# Patient Record
Sex: FEMALE | Race: WHITE | Employment: STUDENT | URBAN - METROPOLITAN AREA
[De-identification: names, ages, dates, MRNs, and addresses within clinical notes are randomized per-mention and may not be internally consistent; named-entity substitution may affect disease eponyms.]

---

## 2020-01-04 PROBLEM — N94.3 PMS (PREMENSTRUAL SYNDROME): Status: ACTIVE | Noted: 2020-01-04

## 2021-11-05 PROBLEM — Z34.00 SUPERVISION OF NORMAL FIRST PREGNANCY, ANTEPARTUM (HCC): Status: ACTIVE | Noted: 2021-11-05

## 2021-11-05 PROBLEM — Z34.00 SUPERVISION OF NORMAL FIRST PREGNANCY, ANTEPARTUM: Status: ACTIVE | Noted: 2021-11-05

## 2021-12-23 PROBLEM — U07.1 COVID-19 AFFECTING PREGNANCY IN SECOND TRIMESTER (HCC): Status: ACTIVE | Noted: 2021-12-23

## 2021-12-23 PROBLEM — O98.512 COVID-19 AFFECTING PREGNANCY IN SECOND TRIMESTER: Status: ACTIVE | Noted: 2021-12-23

## 2021-12-23 PROBLEM — U07.1 COVID-19 AFFECTING PREGNANCY IN SECOND TRIMESTER: Status: ACTIVE | Noted: 2021-12-23

## 2021-12-23 PROBLEM — O98.512 COVID-19 AFFECTING PREGNANCY IN SECOND TRIMESTER (HCC): Status: ACTIVE | Noted: 2021-12-23

## 2021-12-28 PROBLEM — U07.1 COVID-19 AFFECTING PREGNANCY IN SECOND TRIMESTER: Status: ACTIVE | Noted: 2021-12-23

## 2021-12-28 PROBLEM — O98.512 COVID-19 AFFECTING PREGNANCY IN SECOND TRIMESTER (HCC): Status: ACTIVE | Noted: 2021-12-23

## 2021-12-28 PROBLEM — O98.512 COVID-19 AFFECTING PREGNANCY IN SECOND TRIMESTER: Status: ACTIVE | Noted: 2021-12-23

## 2021-12-28 PROBLEM — U07.1 COVID-19 AFFECTING PREGNANCY IN SECOND TRIMESTER (HCC): Status: ACTIVE | Noted: 2021-12-23

## 2023-07-11 LAB
AMB EXT CHLAMYDIA, NUCLEIC ACID AMP: NOT DETECTED
AMB EXT GONOCOCCUS, NUCLEIC ACID AMP: NOT DETECTED
AMB EXT HEMATOCRIT: 37.6
AMB EXT HEMOGLOBIN: 12.9
AMB EXT MCV: 91.3
AMB EXT PLATELETS: 228
ANTIBODY SCREEN OB: NEGATIVE
HIV RESULT OB: NEGATIVE
RAPID PLASMA REAGIN OB: NONREACTIVE
RH FACTOR OB: POSITIVE

## 2023-11-24 LAB
AMB EXT TREPONEMAL ANTIBODIES: NEGATIVE
HIV ANTIGEN-ANTIBODY QUAL: NONREACTIVE

## 2024-01-24 ENCOUNTER — HOSPITAL ENCOUNTER (INPATIENT)
Facility: HOSPITAL | Age: 29
LOS: 3 days | Discharge: HOME | End: 2024-01-27
Attending: OBSTETRICS & GYNECOLOGY | Admitting: OBSTETRICS & GYNECOLOGY
Payer: COMMERCIAL

## 2024-01-24 ENCOUNTER — ANESTHESIA (OUTPATIENT)
Dept: OBGYN UNIT | Facility: HOSPITAL | Age: 29
End: 2024-01-24
Payer: COMMERCIAL

## 2024-01-24 ENCOUNTER — ANESTHESIA EVENT (OUTPATIENT)
Dept: OBGYN UNIT | Facility: HOSPITAL | Age: 29
End: 2024-01-24
Payer: COMMERCIAL

## 2024-01-24 PROBLEM — O26.93 PREGNANCY RELATED CONDITION IN THIRD TRIMESTER: Status: ACTIVE | Noted: 2024-01-24

## 2024-01-24 PROBLEM — O26.93 PREGNANCY RELATED CONDITION IN THIRD TRIMESTER (HCC): Status: ACTIVE | Noted: 2024-01-24

## 2024-01-24 LAB
BASOPHILS # BLD AUTO: 0.03 X10(3) UL (ref 0–0.2)
BASOPHILS NFR BLD AUTO: 0.3 %
DEPRECATED RDW RBC AUTO: 41.7 FL (ref 35.1–46.3)
EOSINOPHIL # BLD AUTO: 0.09 X10(3) UL (ref 0–0.7)
EOSINOPHIL NFR BLD AUTO: 0.8 %
ERYTHROCYTE [DISTWIDTH] IN BLOOD BY AUTOMATED COUNT: 12.6 % (ref 11–15)
HCT VFR BLD AUTO: 35.9 %
HGB BLD-MCNC: 12.5 G/DL
IMM GRANULOCYTES # BLD AUTO: 0.02 X10(3) UL (ref 0–1)
IMM GRANULOCYTES NFR BLD: 0.2 %
LYMPHOCYTES # BLD AUTO: 2.14 X10(3) UL (ref 1–4)
LYMPHOCYTES NFR BLD AUTO: 19.9 %
MCH RBC QN AUTO: 31.7 PG (ref 26–34)
MCHC RBC AUTO-ENTMCNC: 34.8 G/DL (ref 31–37)
MCV RBC AUTO: 91.1 FL
MONOCYTES # BLD AUTO: 0.9 X10(3) UL (ref 0.1–1)
MONOCYTES NFR BLD AUTO: 8.3 %
NEUTROPHILS # BLD AUTO: 7.6 X10 (3) UL (ref 1.5–7.7)
NEUTROPHILS # BLD AUTO: 7.6 X10(3) UL (ref 1.5–7.7)
NEUTROPHILS NFR BLD AUTO: 70.5 %
PLATELET # BLD AUTO: 222 10(3)UL (ref 150–450)
RBC # BLD AUTO: 3.94 X10(6)UL
WBC # BLD AUTO: 10.8 X10(3) UL (ref 4–11)

## 2024-01-24 RX ORDER — BETAMETHASONE SODIUM PHOSPHATE AND BETAMETHASONE ACETATE 3; 3 MG/ML; MG/ML
12 INJECTION, SUSPENSION INTRA-ARTICULAR; INTRALESIONAL; INTRAMUSCULAR; SOFT TISSUE EVERY 24 HOURS
Status: DISCONTINUED | OUTPATIENT
Start: 2024-01-24 | End: 2024-01-25

## 2024-01-24 RX ORDER — METOCLOPRAMIDE HYDROCHLORIDE 5 MG/ML
10 INJECTION INTRAMUSCULAR; INTRAVENOUS ONCE
Status: COMPLETED | OUTPATIENT
Start: 2024-01-24 | End: 2024-01-24

## 2024-01-24 RX ORDER — FAMOTIDINE 10 MG/ML
20 INJECTION, SOLUTION INTRAVENOUS ONCE
Status: COMPLETED | OUTPATIENT
Start: 2024-01-24 | End: 2024-01-24

## 2024-01-24 RX ORDER — CITRIC ACID/SODIUM CITRATE 334-500MG
30 SOLUTION, ORAL ORAL ONCE
Status: COMPLETED | OUTPATIENT
Start: 2024-01-24 | End: 2024-01-24

## 2024-01-24 RX ORDER — ONDANSETRON 2 MG/ML
4 INJECTION INTRAMUSCULAR; INTRAVENOUS EVERY 6 HOURS PRN
Status: DISCONTINUED | OUTPATIENT
Start: 2024-01-24 | End: 2024-01-25

## 2024-01-24 RX ORDER — METOCLOPRAMIDE 10 MG/1
10 TABLET ORAL ONCE
Status: COMPLETED | OUTPATIENT
Start: 2024-01-24 | End: 2024-01-24

## 2024-01-24 RX ORDER — CEFAZOLIN SODIUM/WATER 2 G/20 ML
SYRINGE (ML) INTRAVENOUS
Status: DISCONTINUED
Start: 2024-01-24 | End: 2024-01-25 | Stop reason: WASHOUT

## 2024-01-24 RX ORDER — ACETAMINOPHEN 500 MG
1000 TABLET ORAL ONCE
Status: COMPLETED | OUTPATIENT
Start: 2024-01-24 | End: 2024-01-24

## 2024-01-24 RX ORDER — FAMOTIDINE 20 MG/1
20 TABLET, FILM COATED ORAL ONCE
Status: COMPLETED | OUTPATIENT
Start: 2024-01-24 | End: 2024-01-24

## 2024-01-24 RX ORDER — CALCIUM CARBONATE 500 MG/1
1 TABLET, CHEWABLE ORAL DAILY
COMMUNITY

## 2024-01-24 RX ORDER — LIDOCAINE HCL/EPINEPHRINE/PF 2%-1:200K
VIAL (ML) INJECTION
Status: DISCONTINUED
Start: 2024-01-24 | End: 2024-01-24 | Stop reason: WASHOUT

## 2024-01-24 RX ORDER — METOCLOPRAMIDE HYDROCHLORIDE 5 MG/ML
10 INJECTION INTRAMUSCULAR; INTRAVENOUS ONCE
Status: DISCONTINUED | OUTPATIENT
Start: 2024-01-24 | End: 2024-01-25

## 2024-01-24 RX ORDER — SODIUM CHLORIDE, SODIUM LACTATE, POTASSIUM CHLORIDE, CALCIUM CHLORIDE 600; 310; 30; 20 MG/100ML; MG/100ML; MG/100ML; MG/100ML
125 INJECTION, SOLUTION INTRAVENOUS CONTINUOUS
Status: DISCONTINUED | OUTPATIENT
Start: 2024-01-24 | End: 2024-01-25

## 2024-01-24 RX ORDER — FAMOTIDINE 20 MG/1
20 TABLET, FILM COATED ORAL ONCE
Status: DISCONTINUED | OUTPATIENT
Start: 2024-01-24 | End: 2024-01-25

## 2024-01-24 RX ORDER — FAMOTIDINE 10 MG/ML
20 INJECTION, SOLUTION INTRAVENOUS ONCE
Status: DISCONTINUED | OUTPATIENT
Start: 2024-01-24 | End: 2024-01-25

## 2024-01-24 RX ORDER — CLINDAMYCIN PHOSPHATE 900 MG/50ML
900 INJECTION, SOLUTION INTRAVENOUS ONCE
Status: DISCONTINUED | OUTPATIENT
Start: 2024-01-24 | End: 2024-01-25

## 2024-01-24 RX ORDER — METOCLOPRAMIDE 10 MG/1
10 TABLET ORAL ONCE
Status: DISCONTINUED | OUTPATIENT
Start: 2024-01-24 | End: 2024-01-25

## 2024-01-24 RX ADMIN — SODIUM CHLORIDE, SODIUM LACTATE, POTASSIUM CHLORIDE, CALCIUM CHLORIDE: 600; 310; 30; 20 INJECTION, SOLUTION INTRAVENOUS at 23:49:00

## 2024-01-24 RX ADMIN — BUPIVACAINE HYDROCHLORIDE 1.6 ML: 7.5 INJECTION, SOLUTION INTRASPINAL at 23:57:00

## 2024-01-24 RX ADMIN — MORPHINE SULFATE 0.3 MG: 1 INJECTION, SOLUTION EPIDURAL; INTRATHECAL; INTRAVENOUS at 23:57:00

## 2024-01-24 RX ADMIN — PHENYLEPHRINE HCL 200 MCG: 10 MG/ML VIAL (ML) INJECTION at 23:58:00

## 2024-01-24 RX ADMIN — LIDOCAINE HYDROCHLORIDE 3 ML: 10 INJECTION, SOLUTION EPIDURAL; INFILTRATION; INTRACAUDAL; PERINEURAL at 23:52:00

## 2024-01-25 LAB
ANTIBODY SCREEN: NEGATIVE
RH BLOOD TYPE: POSITIVE
RH BLOOD TYPE: POSITIVE

## 2024-01-25 PROCEDURE — 59514 CESAREAN DELIVERY ONLY: CPT | Performed by: OBSTETRICS & GYNECOLOGY

## 2024-01-25 RX ORDER — AMMONIA INHALANTS 0.04 G/.3ML
0.3 INHALANT RESPIRATORY (INHALATION) AS NEEDED
Status: DISCONTINUED | OUTPATIENT
Start: 2024-01-25 | End: 2024-01-27

## 2024-01-25 RX ORDER — MIDAZOLAM HYDROCHLORIDE 1 MG/ML
INJECTION INTRAMUSCULAR; INTRAVENOUS AS NEEDED
Status: DISCONTINUED | OUTPATIENT
Start: 2024-01-25 | End: 2024-01-25 | Stop reason: SURG

## 2024-01-25 RX ORDER — OXYCODONE HYDROCHLORIDE 5 MG/1
5 TABLET ORAL EVERY 6 HOURS PRN
Status: DISCONTINUED | OUTPATIENT
Start: 2024-01-26 | End: 2024-01-27

## 2024-01-25 RX ORDER — GABAPENTIN 300 MG/1
300 CAPSULE ORAL EVERY 8 HOURS PRN
Status: DISCONTINUED | OUTPATIENT
Start: 2024-01-25 | End: 2024-01-27

## 2024-01-25 RX ORDER — SCOLOPAMINE TRANSDERMAL SYSTEM 1 MG/1
1 PATCH, EXTENDED RELEASE TRANSDERMAL
Status: DISCONTINUED | OUTPATIENT
Start: 2024-01-25 | End: 2024-01-27

## 2024-01-25 RX ORDER — CHOLECALCIFEROL (VITAMIN D3) 25 MCG
1 TABLET,CHEWABLE ORAL DAILY
Status: DISCONTINUED | OUTPATIENT
Start: 2024-01-25 | End: 2024-01-27

## 2024-01-25 RX ORDER — DOCUSATE SODIUM 100 MG/1
100 CAPSULE, LIQUID FILLED ORAL
Status: DISCONTINUED | OUTPATIENT
Start: 2024-01-25 | End: 2024-01-27

## 2024-01-25 RX ORDER — DIPHENHYDRAMINE HCL 25 MG
25 CAPSULE ORAL EVERY 4 HOURS PRN
Status: ACTIVE | OUTPATIENT
Start: 2024-01-25 | End: 2024-01-26

## 2024-01-25 RX ORDER — CLINDAMYCIN PHOSPHATE 150 MG/ML
INJECTION, SOLUTION INTRAVENOUS AS NEEDED
Status: DISCONTINUED | OUTPATIENT
Start: 2024-01-25 | End: 2024-01-25 | Stop reason: SURG

## 2024-01-25 RX ORDER — NALBUPHINE HYDROCHLORIDE 10 MG/ML
2.5 INJECTION, SOLUTION INTRAMUSCULAR; INTRAVENOUS; SUBCUTANEOUS
OUTPATIENT
Start: 2024-01-25

## 2024-01-25 RX ORDER — PROCHLORPERAZINE EDISYLATE 5 MG/ML
5 INJECTION INTRAMUSCULAR; INTRAVENOUS ONCE AS NEEDED
Status: COMPLETED | OUTPATIENT
Start: 2024-01-25 | End: 2024-01-25

## 2024-01-25 RX ORDER — SIMETHICONE 80 MG
80 TABLET,CHEWABLE ORAL 3 TIMES DAILY PRN
Status: DISCONTINUED | OUTPATIENT
Start: 2024-01-25 | End: 2024-01-27

## 2024-01-25 RX ORDER — PHENYLEPHRINE HCL 10 MG/ML
VIAL (ML) INJECTION AS NEEDED
Status: DISCONTINUED | OUTPATIENT
Start: 2024-01-24 | End: 2024-01-25 | Stop reason: SURG

## 2024-01-25 RX ORDER — IBUPROFEN 600 MG/1
600 TABLET ORAL EVERY 6 HOURS
Status: DISCONTINUED | OUTPATIENT
Start: 2024-01-26 | End: 2024-01-27

## 2024-01-25 RX ORDER — NALBUPHINE HYDROCHLORIDE 10 MG/ML
2.5 INJECTION, SOLUTION INTRAMUSCULAR; INTRAVENOUS; SUBCUTANEOUS EVERY 4 HOURS PRN
Status: DISPENSED | OUTPATIENT
Start: 2024-01-25 | End: 2024-01-26

## 2024-01-25 RX ORDER — ACETAMINOPHEN 500 MG
1000 TABLET ORAL EVERY 6 HOURS
Status: DISCONTINUED | OUTPATIENT
Start: 2024-01-25 | End: 2024-01-27

## 2024-01-25 RX ORDER — DEXAMETHASONE SODIUM PHOSPHATE 4 MG/ML
VIAL (ML) INJECTION AS NEEDED
Status: DISCONTINUED | OUTPATIENT
Start: 2024-01-25 | End: 2024-01-25 | Stop reason: SURG

## 2024-01-25 RX ORDER — HYDROCODONE BITARTRATE AND ACETAMINOPHEN 7.5; 325 MG/1; MG/1
1 TABLET ORAL EVERY 6 HOURS PRN
Status: ACTIVE | OUTPATIENT
Start: 2024-01-25 | End: 2024-01-26

## 2024-01-25 RX ORDER — NALOXONE HYDROCHLORIDE 0.4 MG/ML
0.08 INJECTION, SOLUTION INTRAMUSCULAR; INTRAVENOUS; SUBCUTANEOUS
Status: DISPENSED | OUTPATIENT
Start: 2024-01-25 | End: 2024-01-26

## 2024-01-25 RX ORDER — HALOPERIDOL 5 MG/ML
0.5 INJECTION INTRAMUSCULAR ONCE AS NEEDED
Status: COMPLETED | OUTPATIENT
Start: 2024-01-25 | End: 2024-01-25

## 2024-01-25 RX ORDER — HYDROMORPHONE HYDROCHLORIDE 1 MG/ML
0.3 INJECTION, SOLUTION INTRAMUSCULAR; INTRAVENOUS; SUBCUTANEOUS EVERY 2 HOUR PRN
Status: DISCONTINUED | OUTPATIENT
Start: 2024-01-26 | End: 2024-01-27

## 2024-01-25 RX ORDER — HYDROCODONE BITARTRATE AND ACETAMINOPHEN 7.5; 325 MG/1; MG/1
2 TABLET ORAL EVERY 6 HOURS PRN
Status: ACTIVE | OUTPATIENT
Start: 2024-01-25 | End: 2024-01-26

## 2024-01-25 RX ORDER — KETOROLAC TROMETHAMINE 30 MG/ML
30 INJECTION, SOLUTION INTRAMUSCULAR; INTRAVENOUS EVERY 6 HOURS
Status: DISPENSED | OUTPATIENT
Start: 2024-01-25 | End: 2024-01-26

## 2024-01-25 RX ORDER — BISACODYL 10 MG
10 SUPPOSITORY, RECTAL RECTAL ONCE AS NEEDED
Status: DISCONTINUED | OUTPATIENT
Start: 2024-01-25 | End: 2024-01-27

## 2024-01-25 RX ORDER — ONDANSETRON 2 MG/ML
INJECTION INTRAMUSCULAR; INTRAVENOUS AS NEEDED
Status: DISCONTINUED | OUTPATIENT
Start: 2024-01-25 | End: 2024-01-25 | Stop reason: SURG

## 2024-01-25 RX ORDER — ONDANSETRON 2 MG/ML
4 INJECTION INTRAMUSCULAR; INTRAVENOUS ONCE AS NEEDED
Status: ACTIVE | OUTPATIENT
Start: 2024-01-25 | End: 2024-01-25

## 2024-01-25 RX ORDER — HYDROMORPHONE HYDROCHLORIDE 1 MG/ML
0.6 INJECTION, SOLUTION INTRAMUSCULAR; INTRAVENOUS; SUBCUTANEOUS
Status: ACTIVE | OUTPATIENT
Start: 2024-01-25 | End: 2024-01-26

## 2024-01-25 RX ORDER — ACETAMINOPHEN 325 MG/1
650 TABLET ORAL EVERY 6 HOURS PRN
Status: ACTIVE | OUTPATIENT
Start: 2024-01-25 | End: 2024-01-26

## 2024-01-25 RX ORDER — DEXTROSE, SODIUM CHLORIDE, SODIUM LACTATE, POTASSIUM CHLORIDE, AND CALCIUM CHLORIDE 5; .6; .31; .03; .02 G/100ML; G/100ML; G/100ML; G/100ML; G/100ML
INJECTION, SOLUTION INTRAVENOUS CONTINUOUS
Status: DISCONTINUED | OUTPATIENT
Start: 2024-01-25 | End: 2024-01-27

## 2024-01-25 RX ORDER — LIDOCAINE HYDROCHLORIDE 10 MG/ML
INJECTION, SOLUTION EPIDURAL; INFILTRATION; INTRACAUDAL; PERINEURAL AS NEEDED
Status: DISCONTINUED | OUTPATIENT
Start: 2024-01-24 | End: 2024-01-25 | Stop reason: SURG

## 2024-01-25 RX ORDER — KETOROLAC TROMETHAMINE 30 MG/ML
30 INJECTION, SOLUTION INTRAMUSCULAR; INTRAVENOUS ONCE
OUTPATIENT
Start: 2024-01-25 | End: 2024-01-25

## 2024-01-25 RX ORDER — SODIUM CHLORIDE, SODIUM LACTATE, POTASSIUM CHLORIDE, CALCIUM CHLORIDE 600; 310; 30; 20 MG/100ML; MG/100ML; MG/100ML; MG/100ML
INJECTION, SOLUTION INTRAVENOUS CONTINUOUS
OUTPATIENT
Start: 2024-01-25

## 2024-01-25 RX ORDER — DIPHENHYDRAMINE HYDROCHLORIDE 50 MG/ML
INJECTION INTRAMUSCULAR; INTRAVENOUS AS NEEDED
Status: DISCONTINUED | OUTPATIENT
Start: 2024-01-25 | End: 2024-01-25 | Stop reason: SURG

## 2024-01-25 RX ORDER — EPHEDRINE SULFATE 50 MG/ML
INJECTION INTRAVENOUS AS NEEDED
Status: DISCONTINUED | OUTPATIENT
Start: 2024-01-25 | End: 2024-01-25 | Stop reason: SURG

## 2024-01-25 RX ORDER — DIPHENHYDRAMINE HYDROCHLORIDE 50 MG/ML
12.5 INJECTION INTRAMUSCULAR; INTRAVENOUS EVERY 4 HOURS PRN
Status: ACTIVE | OUTPATIENT
Start: 2024-01-25 | End: 2024-01-26

## 2024-01-25 RX ORDER — HYDROMORPHONE HYDROCHLORIDE 1 MG/ML
0.4 INJECTION, SOLUTION INTRAMUSCULAR; INTRAVENOUS; SUBCUTANEOUS
Status: ACTIVE | OUTPATIENT
Start: 2024-01-25 | End: 2024-01-26

## 2024-01-25 RX ORDER — MORPHINE SULFATE 1 MG/ML
INJECTION, SOLUTION EPIDURAL; INTRATHECAL; INTRAVENOUS AS NEEDED
Status: DISCONTINUED | OUTPATIENT
Start: 2024-01-24 | End: 2024-01-25 | Stop reason: SURG

## 2024-01-25 RX ORDER — BUPIVACAINE HYDROCHLORIDE 7.5 MG/ML
INJECTION, SOLUTION INTRASPINAL AS NEEDED
Status: DISCONTINUED | OUTPATIENT
Start: 2024-01-24 | End: 2024-01-25 | Stop reason: SURG

## 2024-01-25 RX ORDER — ONDANSETRON 2 MG/ML
4 INJECTION INTRAMUSCULAR; INTRAVENOUS EVERY 6 HOURS PRN
Status: DISCONTINUED | OUTPATIENT
Start: 2024-01-25 | End: 2024-01-27

## 2024-01-25 RX ORDER — ONDANSETRON 2 MG/ML
4 INJECTION INTRAMUSCULAR; INTRAVENOUS ONCE AS NEEDED
OUTPATIENT
Start: 2024-01-25 | End: 2024-01-25

## 2024-01-25 RX ORDER — SODIUM CHLORIDE, SODIUM LACTATE, POTASSIUM CHLORIDE, CALCIUM CHLORIDE 600; 310; 30; 20 MG/100ML; MG/100ML; MG/100ML; MG/100ML
INJECTION, SOLUTION INTRAVENOUS CONTINUOUS PRN
Status: DISCONTINUED | OUTPATIENT
Start: 2024-01-24 | End: 2024-01-25 | Stop reason: SURG

## 2024-01-25 RX ADMIN — ONDANSETRON 4 MG: 2 INJECTION INTRAMUSCULAR; INTRAVENOUS at 00:32:00

## 2024-01-25 RX ADMIN — MIDAZOLAM HYDROCHLORIDE 2 MG: 1 INJECTION INTRAMUSCULAR; INTRAVENOUS at 00:21:00

## 2024-01-25 RX ADMIN — DIPHENHYDRAMINE HYDROCHLORIDE 50 MG: 50 INJECTION INTRAMUSCULAR; INTRAVENOUS at 00:36:00

## 2024-01-25 RX ADMIN — SODIUM CHLORIDE, SODIUM LACTATE, POTASSIUM CHLORIDE, CALCIUM CHLORIDE: 600; 310; 30; 20 INJECTION, SOLUTION INTRAVENOUS at 00:27:00

## 2024-01-25 RX ADMIN — DEXAMETHASONE SODIUM PHOSPHATE 4 MG: 4 MG/ML VIAL (ML) INJECTION at 00:36:00

## 2024-01-25 RX ADMIN — MIDAZOLAM HYDROCHLORIDE 2 MG: 1 INJECTION INTRAMUSCULAR; INTRAVENOUS at 00:27:00

## 2024-01-25 RX ADMIN — EPHEDRINE SULFATE 10 MG: 50 INJECTION INTRAVENOUS at 00:00:00

## 2024-01-25 RX ADMIN — CLINDAMYCIN PHOSPHATE 900 MG: 150 INJECTION, SOLUTION INTRAVENOUS at 00:14:00

## 2024-01-25 RX ADMIN — ONDANSETRON 4 MG: 2 INJECTION INTRAMUSCULAR; INTRAVENOUS at 00:00:00

## 2024-01-25 RX ADMIN — DEXAMETHASONE SODIUM PHOSPHATE 4 MG: 4 MG/ML VIAL (ML) INJECTION at 00:00:00

## 2024-01-25 NOTE — ANESTHESIA POSTPROCEDURE EVALUATION
Patient: Thea Silva    Procedure Summary       Date: 24 Room / Location: [facility] L+D OR  [facility] L+D OR    Anesthesia Start:  Anesthesia Stop: 24    Procedure:  SECTION (Abdomen) Diagnosis:       Delivered by  delivery following previous  delivery      (repeat c/s)    Surgeons: Aura Addison MD Anesthesiologist: Polly Hsu DO    Anesthesia Type: spinal ASA Status: 3 - Emergent            Anesthesia Type: spinal    Vitals Value Taken Time   /65 24 0107   Temp 96.9 24 0117   Pulse 71 24 011   Resp 13 24   SpO2 98 % 24   Vitals shown include unfiled device data.    EM AN Post Evaluation:   Patient Evaluated in PACU  Patient Participation: complete - patient participated  Level of Consciousness: awake and alert  Pain Score: 0  Pain Management: adequate  Airway Patency:patent  Dental exam unchanged from preop  Yes    Cardiovascular Status: hemodynamically stable  Respiratory Status: spontaneous ventilation, unassisted, room air and nonlabored ventilation  Postoperative Hydration stable  Comments: No signs or symptoms (skin eruptions, hypotension, swelling of the tongue/lips/mouth, shortness of breath etc) indicative of allergic reaction to any medications administered observed or reported.  No signs or symptoms indicative of aspiration of gastric contents observed or reported.   Flushing and redness of the chest/neck/face observed in the OR is attributed to combination of nausea and vomiting, anxiety, and possible panic attack. It has improved following the administration versed, additional zofran with decadron, and benadryl.   Patient calm and reported resolution of the above leaving the OR and In the PACU.       Polly Hsu DO  2024 1:17 AM

## 2024-01-25 NOTE — PROGRESS NOTES
Pt is a 28 year old female admitted to TR1/TR1-A.     Chief Complaint   Patient presents with    R/o Labor     Contractions since 6 pm      Pt is  36w2d intra-uterine pregnancy.  History obtained, consents signed. Oriented to room, staff, and plan of care.

## 2024-01-25 NOTE — PLAN OF CARE
Problem: GENITOURINARY - ADULT  Goal: Absence of urinary retention  Description: INTERVENTIONS:  - Assess patient’s ability to void and empty bladder  - Monitor intake/output and perform bladder scan as needed  - Follow urinary retention protocol/standard of care  - Consider collaborating with pharmacy to review patient's medication profile  - Implement strategies to promote bladder emptying  Outcome: Progressing     Problem: POSTPARTUM  Goal: Long Term Goal:Experiences normal postpartum course  Description: INTERVENTIONS:  - Assess and monitor vital signs and lab values.  - Assess fundus and lochia.  - Provide ice/sitz baths for perineum discomfort.  - Monitor healing of incision/episiotomy/laceration, and assess for signs and symptoms of infection and hematoma.  - Assess bladder function and monitor for bladder distention.  - Provide/instruct/assist with pericare as needed.  - Provide VTE prophylaxis as needed.  - Monitor bowel function.  - Encourage ambulation and provide assistance as needed.  - Assess and monitor emotional status and provide social service/psych resources as needed.  - Utilize standard precautions and use personal protective equipment as indicated. Ensure aseptic care of all intravenous lines and invasive tubes/drains.  - Obtain immunization and exposure to communicable diseases history.  Outcome: Progressing  Goal: Optimize infant feeding at the breast  Description: INTERVENTIONS:  - Initiate breast feeding within first hour after birth.   - Monitor effectiveness of current breast feeding efforts.  - Assess support systems available to mother/family.  - Identify cultural beliefs/practices regarding lactation, letdown techniques, maternal food preferences.  - Assess mother's knowledge and previous experience with breast feeding.  - Provide information as needed about early infant feeding cues (e.g., rooting, lip smacking, sucking fingers/hand) versus late cue of crying.  - Discuss/demonstrate  breast feeding aids (e.g., infant sling, nursing footstool/pillows, and breast pumps).  - Encourage mother/other family members to express feelings/concerns, and actively listen.  - Educate father/SO about benefits of breast feeding and how to manage common lactation challenges.  - Recommend avoidance of specific medications or substances incompatible with breast feeding.  - Assess and monitor for signs of nipple pain/trauma.  - Instruct and provide assistance with proper latch.  - Review techniques for milk expression (breast pumping) and storage of breast milk. Provide pumping equipment/supplies, instructions and assistance, as needed.  - Encourage rooming-in and breast feeding on demand.  - Encourage skin-to-skin contact.  - Provide LC support as needed.  - Assess for and manage engorgement.  - Provide breast feeding education handouts and information on community breast feeding support.   Outcome: Progressing  Goal: Establishment of adequate milk supply with medication/procedure interruptions  Description: INTERVENTIONS:  - Review techniques for milk expression (breast pumping).   - Provide pumping equipment/supplies, instructions, and assistance until it is safe to breastfeed infant.  Outcome: Progressing  Goal: Experiences normal breast weaning course  Description: INTERVENTIONS:  - Assess for and manage engorgement.  - Instruct on breast care.  - Provide comfort measures.  Outcome: Progressing  Goal: Appropriate maternal -  bonding  Description: INTERVENTIONS:  - Assess caregiver- interactions.  - Assess caregiver's emotional status and coping mechanisms.  - Encourage caregiver to participate in  daily care.  - Assess support systems available to mother/family.  - Provide /case management support as needed.  Outcome: Progressing

## 2024-01-25 NOTE — ANESTHESIA PREPROCEDURE EVALUATION
Anesthesia PreOp Note    HPI:     Thea Silva is a 28 year old female who presents for preoperative consultation requested by: * No surgeons listed *    Date of Surgery: 1/24/2024    * No procedures listed *  Indication: * No pre-op diagnosis entered *    Relevant Problems   No relevant active problems       NPO:  Last Liquid Consumption Date: 01/24/24  Last Liquid Consumption Time: 1800  Last Solid Consumption Date: 01/24/24  Last Solid Consumption Time: 1800  Last Liquid Consumption Date: 01/24/24          History Review:  Patient Active Problem List    Diagnosis Date Noted    Pregnancy related condition in third trimester 01/24/2024    COVID-19 affecting pregnancy in second trimester 12/23/2021    Supervision of normal first pregnancy, antepartum 11/05/2021    PMS (premenstrual syndrome) 01/04/2020    ADD (attention deficit disorder) 05/19/2016    History of depression 05/19/2016       Past Medical History:   Diagnosis Date    COVID-19 12/23/2021    History of depression        Past Surgical History:   Procedure Laterality Date    APPENDECTOMY  07/2014    lap       Medications Prior to Admission   Medication Sig Dispense Refill Last Dose    calcium carbonate 500 MG Oral Chew Tab Chew 1 tablet (500 mg total) by mouth daily.   1/24/2024 at 1800    Prenatal Multivit-Min-Fe-FA (PRENATAL 1 + IRON OR) Take by mouth.   1/23/2024 at 1000     Current Facility-Administered Medications Ordered in Epic   Medication Dose Route Frequency Provider Last Rate Last Admin    lactated ringers IV bolus 1,000 mL  1,000 mL Intravenous Once Aura Addison MD        Followed by    lactated ringers infusion  125 mL/hr Intravenous Continuous Aura Addison MD        ondansetron (Zofran) 4 MG/2ML injection 4 mg  4 mg Intravenous Q6H PRN Aura Addison MD        oxyTOCIN in sodium chloride 0.9% (Pitocin) 30 Units/500mL infusion premix  62.5-900 howie-units/min Intravenous Continuous Aura Addison MD        metoclopramide  (Reglan) tab 10 mg  10 mg Oral Once Aura Addison MD        Or    metoclopramide (Reglan) 5 mg/mL injection 10 mg  10 mg Intravenous Once Aura Addison MD        betamethasone sodium phosphate & acetate (Celestone) 6 (3-3) MG/ML injection 12 mg  12 mg Intramuscular Q24H Aura Addison MD   12 mg at 01/24/24 2316    gentamicin (Garamycin) 5 mg/kg in sodium chloride 0.9% 100 mL IVPB  5 mg/kg Intravenous Once Aura Addison MD        And    clindamycin phosphate in NaCl 0.9% (Cleocin) 900 mg/50mL IVPB premix 900 mg  900 mg Intravenous Once Aura Addison MD        oxyTOCIN in sodium chloride 0.9% (Pitocin) 30 Units/500mL infusion premix  62.5-900 howie-units/min Intravenous Continuous Aura Addison MD        famotidine (Pepcid) tab 20 mg  20 mg Oral Once Aura Addison MD        Or    famotidine (Pepcid) 20 mg/2mL injection 20 mg  20 mg Intravenous Once Aura Addison MD        ceFAZolin (Ancef) 2 g/20mL IV syringe premix              No current Epic-ordered outpatient medications on file.       Allergies   Allergen Reactions    Ceclor RASH       Family History   Problem Relation Age of Onset    No Known Problems Father     No Known Problems Mother     Cancer Maternal Grandmother         breast    Cancer Maternal Grandfather         lung    Cancer Paternal Grandmother         breast    Cancer Paternal Grandfather         skin     Social History     Socioeconomic History    Marital status:    Tobacco Use    Smoking status: Never     Passive exposure: Never    Smokeless tobacco: Never   Vaping Use    Vaping Use: Never used   Substance and Sexual Activity    Alcohol use: Not Currently     Alcohol/week: 1.0 standard drink of alcohol     Types: 1 Standard drinks or equivalent per week     Comment: social    Drug use: Never    Sexual activity: Yes     Partners: Male   Other Topics Concern     Service No    Blood Transfusions No    Caffeine Concern No    Occupational Exposure No     Hobby Hazards No    Sleep Concern No    Stress Concern No    Weight Concern No    Special Diet Yes     Comment: gluten free    Back Care No    Exercise Yes    Seat Belt Yes    Self-Exams No       Available pre-op labs reviewed.  Lab Results   Component Value Date    WBC 10.8 01/24/2024    RBC 3.94 01/24/2024    HGB 12.5 01/24/2024    HCT 35.9 01/24/2024    MCV 91.1 01/24/2024    MCH 31.7 01/24/2024    MCHC 34.8 01/24/2024    RDW 12.6 01/24/2024    .0 01/24/2024             Vital Signs:  There is no height or weight on file to calculate BMI.   temperature is 98.2 °F (36.8 °C). Her blood pressure is 118/78 and her pulse is 80. Her respiration is 19.   Vitals:    01/24/24 2230   BP: 118/78   Pulse: 80   Resp: 19   Temp: 98.2 °F (36.8 °C)        Anesthesia Evaluation     Patient summary reviewed and Nursing notes reviewed    No history of anesthetic complications   Airway   Mallampati: II  TM distance: >3 FB  Neck ROM: full  Dental - Dentition appears grossly intact     Pulmonary - negative ROS and normal exam   (-) asthma, shortness of breath, recent URI  Cardiovascular - negative ROS  Exercise tolerance: good  (-) hypertension, dysrhythmias    Rhythm: regular  Rate: normal    Neuro/Psych    (+)   attention deficit disorder, depression      GI/Hepatic/Renal - negative ROS   (-) hepatitis, liver disease, renal disease    Endo/Other - negative ROS   (-) diabetes mellitus, blood dyscrasia  Abdominal  - normal exam                 Anesthesia Plan:   ASA:  3  Emergent    Plan:   Spinal  Post-op Pain Management: Intrathecal narcotics  Informed Consent Plan and Risks Discussed With:  Patient and spouse  Blood Product Use Consented    Discussed plan with:  Surgeon      I have informed Thea Silva and/or legal guardian or family member of the nature of the anesthetic plan, benefits, risks including possible risk of aspiration given inadequate NPO status, the patient and partner expressed understanding and accept  this risk. Also discussed other potential risks including but not limited to dental damage if relevant, major complications, and any alternative forms of anesthetic management such as general anesthesia with tracheal intubation.    All of the patient's questions were answered to the best of my ability. The patient desires the anesthetic management as planned.  Polly Hsu DO  1/24/2024 11:32 PM  Present on Admission:  **None**

## 2024-01-25 NOTE — ANESTHESIA PROCEDURE NOTES
Spinal Block    Date/Time: 1/24/2024 11:52 PM    Performed by: Polly Hsu DO  Authorized by: Polly Hsu DO      General Information and Staff    Start Time:  1/24/2024 11:52 PM  End Time:  1/24/2024 11:57 PM  Anesthesiologist:  Polly Hsu DO  Performed by:  Anesthesiologist  Patient Location:  OR  Site identification: surface landmarks    Reason for Block: surgical anesthesia    Preanesthetic Checklist: patient identified, IV checked, risks and benefits discussed, monitors and equipment checked, pre-op evaluation, timeout performed, anesthesia consent and sterile technique used      Procedure Details    Patient Position:  Sitting  Prep: ChloraPrep and patient draped    Monitoring:  Cardiac monitor and continuous pulse ox  Approach:  Midline  Location:  L3-4  Injection Technique:  Single-shot    Needle    Needle Type:  Pencil-tip  Needle Gauge:  24 G  Needle Length:  4 in    Assessment    Sensory Level:  T6  Events: clear CSF, CSF aspirated, well tolerated and blood negative      Additional Comments

## 2024-01-25 NOTE — PROGRESS NOTES
Pt transferred to room 349 in stable condition. Received report from CHRIS Borja&MELE RN. VSS, afebrile. Fundus is firm U/1, bleeding is small. ABD dressing dry and intact. SCD's on. Hubbard in place. Plan of care and paperwork reviewed with pt. Questions and concerns answered. Will continue with plan of care.

## 2024-01-25 NOTE — OPERATIVE REPORT
[facility]     Section Delivery / Operative Note    Thea Silva Patient Status:  Inpatient    1995 MRN [id number]   Location [facility] Attending Aura Addison MD   Hosp Day # 1 PCP Maribell Torrez MD     Pre Op Diagnosis:  IUP at 36 wks,  labor, h/o C/S declining trial of labor    Post Op Diagnosis:  Same as pre-op    Procedure:  Repeat LTCS    Surgeon:  Aura Addison MD    Assistant Surgeon:  Dylan Nazario necessary for adequate visualization and delivery of      Anesthesia:  Spinal by Dr. Lopez    Complications: none     Antibiotics:  gentamycin 5 mg / kg and Clindamycin preoperatively    EBL: 700cc    Specimens:   Placenta to pathology    Findings: normal uterus, normal tubes bilaterally, normal ovaries bilaterally. Live male infant, Apgars 9 & 9, weight 5 lbs, 12 oz uchal Cord:  none  clear amniotic fluid noted.    Infant:  Date of Delivery: 2024    Time of Delivery: 12:17 AM   Delivery Type: Caesarean Section     Infant Sex  Information for the patient's :  Dennis Silva [[id number]]   male     Infant Birthweight  Information for the patient's :  Dennis iSlva [[id number]]   No birth weight on file.      Presentation    Position          Apgars:  1 minute:                 5 minutes:                          10 minutes:      Placenta:  Date/Time of Delivery:     Delivery: spontaneous  Placenta to Pathology: yes    Cord:  Cord Gases Submitted: no  Cord Blood/Tissue Collection: no  Cord Complications: none    Delivery Comment:  Pt presented in active labor with h/o C/S declining trial of labor.    Sponge and Needle Counts:  Verified      Operative note:  The patient was prepped and draped in the normal usual sterile fashion after SCDs & bowen catheter placed. A time out was performed. After adequate level of anesthesia was ascertained, a Pfannenstiel skin incision was made and extended down to the level  of the fascia. The fascia was incised and extended bilaterally with curved Goss scissors.  The rectus muscles were  superiorly and inferiorly.  The peritoneal cavity was entered with blunt dissection superiorly and extended inferiorly, with good visualization of bladder at all times.  A bladder blade was inserted, bladder flap created and bladder blade replaced. The uterus was scored in the midline. clear encountered. The lower uterine incision was extended laterally & superiorly bluntly.  The infant's head was guided through the incision while fundal pressure was applied by assistant.  The infant's mouth & naso cavities were bulb suctioned. No nuchal cord noted. The remainder of the body was delivered without complication.  The infant was vigorously crying. The cord was doubly clamped and cut. The infant was shown to the parents and placed in the radiant warmer.   Cord blood was obtained.  Manual removal of placenta was performed.  The uterus was externalized.  Uterus, tubes and ovaries were normal in appearance.  The uterine cavity was cleaned of all clots and debris using a wet lap.  The cervix was dilated with a ring forcep which was then passed off of the field.  The bladder blade was replaced. The edges of uterine incision was grasped with ring forceps.  The uterus was closed in two layer fashion, first being interlocking, the second being imbricating using 0-Vicryl.  The incision was inspected for hemostasis.  The cul de sac was cleared of all clots / debris. The uterus was replaced into the abdominal cavity and the gutters were swept clean.  Re-inspection of the hysterotomy, peritomeum and rectus muscles noted them to be entirely hemostatic.  The fascia was closed in two halves using 0 Vicryl.  The subcutaneous tissue was copiously irrigated and any bleeding cauterized.  The subcutaneous tissue was closed using 2.0 plain gut. The skin was closed using 4-0 vicryl with steristrips applied. Pressure  dressing was applied.  All sponge, instrument and needle counts were correct x 2.  The patient tolerated the procedure well and was taken to recovery room in alert & stable fashion.      Aura Addison MD  1/25/2024  12:56 AM

## 2024-01-25 NOTE — PROGRESS NOTES
[facility]    OB/GYNE Progress Note      Thea Silva Patient Status:  Inpatient    1995 MRN [id number]   Location [facility] 3SE Attending Aura Addison MD   Hosp Day # 1 PCP Maribell Torrez MD       Assessment/Plan       Assessment  Problems:   Patient Active Problem List   Diagnosis    ADD (attention deficit disorder)    History of depression    PMS (premenstrual syndrome)    Supervision of normal first pregnancy, antepartum    COVID-19 affecting pregnancy in second trimester    Pregnancy related condition in third trimester      POD #1/2  Abd binder  Routine cares    Patient desires circumcision for her son.  Risks and benefits reviewed, including, but not limited to bleeding, infection and displeasure with cosmetic appearance.  Patient verbalized understanding and has signed the consent.          Subjective       Review of Systems:  Constitutional: feeling better and sleeping well      Objective   Vital signs in last 24 hours:  Temp:  [96.9 °F (36.1 °C)-98.2 °F (36.8 °C)] 97.8 °F (36.6 °C)  Pulse:  [62-80] 62  Resp:  [11-19] 18  BP: (102-120)/(62-78) 107/67  SpO2:  [96 %-100 %] 96 %    Input/Output:    Intake/Output Summary (Last 24 hours) at 2024 0809  Last data filed at 2024 0511  Gross per 24 hour   Intake 1300 ml   Output 1662 ml   Net -362 ml       Weight (Last 6):  Wt Readings from Last 6 Encounters:   24 174 lb (78.9 kg)   22 177 lb (80.3 kg)   22 177 lb (80.3 kg)   22 179 lb 8 oz (81.4 kg)   22 175 lb (79.4 kg)   03/15/22 175 lb 9.6 oz (79.7 kg)     Body mass index is 24.97 kg/m².    Constitutional: comfortable  Uterus: fundus firm and fundas appropriately tender  Wound/Incision:  dressing intact and in place  Extremities: SCDs on and functioning  No calf tenderness      Bowen Catheter Information  Does patient have a bowen catheter: yes      Results:     Lab Results   Component Value Date    WBC 10.8 2024    HGB 12.5  01/24/2024    HCT 35.9 01/24/2024    .0 01/24/2024         No results found.    Capri Diaz MD  1/25/2024  8:09 AM

## 2024-01-25 NOTE — H&P
[facility]    History & Physical    Thea Silva Patient Status:  Inpatient    1995 MRN [id number]   Location [facility] Attending Aura Addison MD   Hosp Day # 0 PCP Maribell Torrez MD     Date of Admission:  2024      HPI:   Thea Silva is a 28 year old  current EGA of 36w2d with an estimated date of delivery of: 2024, by LMP c/w 8 week ultrasound presents with painful contractions. Cervix was closed at her last visit, and SVE in triage 80/0.  Pt with h/o C/S declining trial of labor.  Being admitted for .      Patient Active Problem List   Diagnosis    ADD (attention deficit disorder)    History of depression    PMS (premenstrual syndrome)    Supervision of normal first pregnancy, antepartum    COVID-19 affecting pregnancy in second trimester    Pregnancy related condition in third trimester     Fetal Movement reported as good.  GBS unknown Rh positive.    History   Obstetric History:   OB History    Para Term  AB Living   2 1 1 0 0 1   SAB IAB Ectopic Multiple Live Births   0 0 0 0 1         Gyne History: Cervical Papanicolaou done within 1 year of adm    Past Medical History:   Past Medical History:   Diagnosis Date    ADD (attention deficit disorder)     sees psych, Dr. Perez    COVID-19 2021    History of depression        Past Surgerical History:   Past Surgical History:   Procedure Laterality Date    APPENDECTOMY  2014    lap       Social History:   Social History     Tobacco Use    Smoking status: Never    Smokeless tobacco: Never   Substance Use Topics    Alcohol use: Not Currently     Alcohol/week: 1.0 standard drink of alcohol     Types: 1 Standard drinks or equivalent per week     Comment: social        Allergies/Medications:   Allergies:   Allergies   Allergen Reactions    Ceclor RASH       Medications:  Medications Prior to Admission   Medication Sig Dispense Refill Last Dose    Prenatal  Multivit-Min-Fe-FA (PRENATAL 1 + IRON OR) Take by mouth.            Review of Systems:   As documented in HPI      Physical Exam:   Pulse:  [80] 80  BP: (118)/(78) 118/78    Constitutional: alert and cooperative in No distress    Abdomen: gravid nontender, EFW 6 lbs, vertex    Vaginal exam: /0    FHT assessment:   Moderate variability, (+) accels, no decels    Results:   No results found for this or any previous visit (from the past 24 hour(s)).    No results found.        Assessment/Plan:   IUP 36w2d in active labor with h/o  for repeat     Treatment Plan:    Risks, benefits, alternatives and possible complications have been discussed in detail with the patient including risks of infection, bleeding, and damage to internal organs like bowel and bladder. Questions answered and informed consent obtained.  [facility] for [facility].    All questions answered; all appropriate consents will be signed at the Hospital.        Aura Addison MD  2024  10:48 PM

## 2024-01-25 NOTE — PROGRESS NOTES
Patient transferred to mother/baby room 349 per cart in stable condition with baby and personal belongings.  Accompanied by care partner and staff.  Report given to mother/baby RN.

## 2024-01-26 LAB
BASOPHILS # BLD AUTO: 0.03 X10(3) UL (ref 0–0.2)
BASOPHILS NFR BLD AUTO: 0.2 %
DEPRECATED RDW RBC AUTO: 43.6 FL (ref 35.1–46.3)
EOSINOPHIL # BLD AUTO: 0.03 X10(3) UL (ref 0–0.7)
EOSINOPHIL NFR BLD AUTO: 0.2 %
ERYTHROCYTE [DISTWIDTH] IN BLOOD BY AUTOMATED COUNT: 12.9 % (ref 11–15)
HCT VFR BLD AUTO: 32.3 %
HGB BLD-MCNC: 10.9 G/DL
IMM GRANULOCYTES # BLD AUTO: 0.08 X10(3) UL (ref 0–1)
IMM GRANULOCYTES NFR BLD: 0.4 %
LYMPHOCYTES # BLD AUTO: 1.7 X10(3) UL (ref 1–4)
LYMPHOCYTES NFR BLD AUTO: 9.1 %
MCH RBC QN AUTO: 31.4 PG (ref 26–34)
MCHC RBC AUTO-ENTMCNC: 33.7 G/DL (ref 31–37)
MCV RBC AUTO: 93.1 FL
MONOCYTES # BLD AUTO: 1.46 X10(3) UL (ref 0.1–1)
MONOCYTES NFR BLD AUTO: 7.8 %
NEUTROPHILS # BLD AUTO: 15.48 X10 (3) UL (ref 1.5–7.7)
NEUTROPHILS # BLD AUTO: 15.48 X10(3) UL (ref 1.5–7.7)
NEUTROPHILS NFR BLD AUTO: 82.3 %
PLATELET # BLD AUTO: 232 10(3)UL (ref 150–450)
RBC # BLD AUTO: 3.47 X10(6)UL
WBC # BLD AUTO: 18.8 X10(3) UL (ref 4–11)

## 2024-01-26 RX ORDER — CALCIUM CARBONATE 500 MG/1
500 TABLET, CHEWABLE ORAL 3 TIMES DAILY PRN
Status: DISCONTINUED | OUTPATIENT
Start: 2024-01-26 | End: 2024-01-27

## 2024-01-26 RX ORDER — PANTOPRAZOLE SODIUM 40 MG/1
40 TABLET, DELAYED RELEASE ORAL
Status: DISCONTINUED | OUTPATIENT
Start: 2024-01-26 | End: 2024-01-27

## 2024-01-26 RX ORDER — PANTOPRAZOLE SODIUM 40 MG/1
40 TABLET, DELAYED RELEASE ORAL
Status: DISCONTINUED | OUTPATIENT
Start: 2024-01-27 | End: 2024-01-26

## 2024-01-26 NOTE — PLAN OF CARE
Problem: GENITOURINARY - ADULT  Goal: Absence of urinary retention  Description: INTERVENTIONS:  - Assess patient’s ability to void and empty bladder  - Monitor intake/output and perform bladder scan as needed  - Follow urinary retention protocol/standard of care  - Consider collaborating with pharmacy to review patient's medication profile  - Implement strategies to promote bladder emptying  Outcome: Progressing     Problem: POSTPARTUM  Goal: Long Term Goal:Experiences normal postpartum course  Description: INTERVENTIONS:  - Assess and monitor vital signs and lab values.  - Assess fundus and lochia.  - Provide ice/sitz baths for perineum discomfort.  - Monitor healing of incision/episiotomy/laceration, and assess for signs and symptoms of infection and hematoma.  - Assess bladder function and monitor for bladder distention.  - Provide/instruct/assist with pericare as needed.  - Provide VTE prophylaxis as needed.  - Monitor bowel function.  - Encourage ambulation and provide assistance as needed.  - Assess and monitor emotional status and provide social service/psych resources as needed.  - Utilize standard precautions and use personal protective equipment as indicated. Ensure aseptic care of all intravenous lines and invasive tubes/drains.  - Obtain immunization and exposure to communicable diseases history.  Outcome: Progressing  Goal: Optimize infant feeding at the breast  Description: INTERVENTIONS:  - Initiate breast feeding within first hour after birth.   - Monitor effectiveness of current breast feeding efforts.  - Assess support systems available to mother/family.  - Identify cultural beliefs/practices regarding lactation, letdown techniques, maternal food preferences.  - Assess mother's knowledge and previous experience with breast feeding.  - Provide information as needed about early infant feeding cues (e.g., rooting, lip smacking, sucking fingers/hand) versus late cue of crying.  - Discuss/demonstrate  breast feeding aids (e.g., infant sling, nursing footstool/pillows, and breast pumps).  - Encourage mother/other family members to express feelings/concerns, and actively listen.  - Educate father/SO about benefits of breast feeding and how to manage common lactation challenges.  - Recommend avoidance of specific medications or substances incompatible with breast feeding.  - Assess and monitor for signs of nipple pain/trauma.  - Instruct and provide assistance with proper latch.  - Review techniques for milk expression (breast pumping) and storage of breast milk. Provide pumping equipment/supplies, instructions and assistance, as needed.  - Encourage rooming-in and breast feeding on demand.  - Encourage skin-to-skin contact.  - Provide LC support as needed.  - Assess for and manage engorgement.  - Provide breast feeding education handouts and information on community breast feeding support.   Outcome: Progressing  Goal: Establishment of adequate milk supply with medication/procedure interruptions  Description: INTERVENTIONS:  - Review techniques for milk expression (breast pumping).   - Provide pumping equipment/supplies, instructions, and assistance until it is safe to breastfeed infant.  Outcome: Progressing  Goal: Experiences normal breast weaning course  Description: INTERVENTIONS:  - Assess for and manage engorgement.  - Instruct on breast care.  - Provide comfort measures.  Outcome: Progressing  Goal: Appropriate maternal -  bonding  Description: INTERVENTIONS:  - Assess caregiver- interactions.  - Assess caregiver's emotional status and coping mechanisms.  - Encourage caregiver to participate in  daily care.  - Assess support systems available to mother/family.  - Provide /case management support as needed.  Outcome: Progressing     VSS, afebrile. Pt with intermittent pain relieved with Tylenol. Lungs clear. BS active. No BM yet. Voiding freely. Per pt not passing gas.  Incision with steri strips dry and intact. Fundus is firm, U/U, bleeding is scant. Plan of care reviewed with pt. Questions and concerns answered. Will continue with plan of care.

## 2024-01-26 NOTE — PROGRESS NOTES
[facility]    OB/Gyne Post  Section Progress Note      Thea Silva Patient Status:  Inpatient    1995 MRN [id number]   Location [facility] 3SE Attending Aura Addison MD   Hosp Day # 2 PCP Maribell Torrez MD       Subjective     Pt denies N/V/F/C/CP/SOB/palpitations, dizziness.      Good pain control. Pain described as period cramps  Tolerating present diet.   Ambulating well.  Voiding freely.    Breastfeeding No   Formula Feeding: Yes   Tolerating Diet Yes   Flatus: No   BM: No   VB minimal per pt    Pt reports feeling much better compared to after her first C/S.      Objective   Vital signs in last 24 hours:  Temp:  [97.8 °F (36.6 °C)-98.2 °F (36.8 °C)] 98 °F (36.7 °C)  Pulse:  [57-71] 57  Resp:  [14-18] 14  BP: ()/(59-68) 114/67  SpO2:  [96 %-98 %] 97 %    Input/Output:    Intake/Output Summary (Last 24 hours) at 2024 1039  Last data filed at 2024 1800  Gross per 24 hour   Intake --   Output 1600 ml   Net -1600 ml       Constitutional: comfortable  Abdomen: soft; appropriately tender; non distended  BS: Present   Uterus: fundus 1 below  umbilicus,   appropriately tender  Wound/Incision:  Clean / dry / intact with steri strips; no erythema and no ecchymosis  Extremities: No calf tenderness, No pitting edema.  Lochia: minimal      Results:   Labs / Path / Radiology:    Recent Results (from the past 24 hour(s))   CBC W/ DIFFERENTIAL    Collection Time: 24  6:04 AM   Result Value Ref Range    WBC 18.8 (H) 4.0 - 11.0 x10(3) uL    RBC 3.47 (L) 3.80 - 5.30 x10(6)uL    HGB 10.9 (L) 12.0 - 16.0 g/dL    HCT 32.3 (L) 35.0 - 48.0 %    MCV 93.1 80.0 - 100.0 fL    MCH 31.4 26.0 - 34.0 pg    MCHC 33.7 31.0 - 37.0 g/dL    RDW-SD 43.6 35.1 - 46.3 fL    RDW 12.9 11.0 - 15.0 %    .0 150.0 - 450.0 10(3)uL    Neutrophil Absolute Prelim 15.48 (H) 1.50 - 7.70 x10 (3) uL    Neutrophil Absolute 15.48 (H) 1.50 - 7.70 x10(3) uL    Lymphocyte Absolute 1.70 1.00 -  4.00 x10(3) uL    Monocyte Absolute 1.46 (H) 0.10 - 1.00 x10(3) uL    Eosinophil Absolute 0.03 0.00 - 0.70 x10(3) uL    Basophil Absolute 0.03 0.00 - 0.20 x10(3) uL    Immature Granulocyte Absolute 0.08 0.00 - 1.00 x10(3) uL    Neutrophil % 82.3 %    Lymphocyte % 9.1 %    Monocyte % 7.8 %    Eosinophil % 0.2 %    Basophil % 0.2 %    Immature Granulocyte % 0.4 %       Specimens (From admission, onward)      None            No results found.      Assessment/Plan   POD#   1.5   presented in labor with h/o C/S x 1, desiring repeat with following issues:   Patient Active Problem List   Diagnosis    ADD (attention deficit disorder)    History of depression    PMS (premenstrual syndrome)    Supervision of normal first pregnancy, antepartum    COVID-19 affecting pregnancy in second trimester    Pregnancy related condition in third trimester   .    This is a 28 year old  who presented in labor with h/o C/S x 1 desiring repeat C/S  s/p C/S POD #  1.5      Post-Op care:  -Pt doing well overall  -Pain controlled  -Bottle-feeding, lactation consultant available for assistance  CPM, ambulate    2. Anemia:   -most likely 2/2 intra-op losses  -s/p C/S Hgb 10.9  -Asx and hemodynamically stable  -Will encourage cont PNV and increase intake of iron rich foods    3. Disposition:   Will re-evaluate tomorrow am  She will like be discharged tomorrow am        Amy Guerrero MD  2024  10:39 AM

## 2024-01-27 VITALS
TEMPERATURE: 98 F | OXYGEN SATURATION: 97 % | HEIGHT: 70 IN | RESPIRATION RATE: 16 BRPM | HEART RATE: 87 BPM | DIASTOLIC BLOOD PRESSURE: 88 MMHG | SYSTOLIC BLOOD PRESSURE: 126 MMHG | WEIGHT: 174 LBS | BODY MASS INDEX: 24.91 KG/M2

## 2024-01-27 RX ORDER — ACETAMINOPHEN 500 MG
1000 TABLET ORAL EVERY 6 HOURS
Status: SHIPPED | COMMUNITY
Start: 2024-01-27

## 2024-01-27 RX ORDER — IBUPROFEN 600 MG/1
600 TABLET ORAL EVERY 6 HOURS
Qty: 30 TABLET | Refills: 1 | Status: SHIPPED | OUTPATIENT
Start: 2024-01-27

## 2024-01-27 NOTE — DISCHARGE INSTRUCTIONS
Pelvic rest for 6 weeks: nothing in the vagina (tampons, intercourse).   May shower, no bathtubs or swimming.    - Call OB for any concerns:     *Heavy bleeding that saturates one pad per hour for several hours/clots bigger than a golf ball     *Signs of preeclampsia (headache, vision changes, nausea/vomiting)     *Increased pain unrelieved by oral medications     *Anxiety or depression     *Breast concerns     *Fever 100.4 or greater     *Dizziness/fainting     *Calf pain, redness or swelling    Follow up with OB in 6 weeks.

## 2024-01-27 NOTE — PROGRESS NOTES
Discharge order received from MD. Mom in stable condition. Mom and baby ID band removed and numbers verified against eachother. Discharge instructions given regarding pelvic rest, preeclampsia signs and symptoms, MD followup, and pain medications. Mom escorted to vehicle via wheelchair, baby secured in carseat.

## 2024-01-27 NOTE — PROGRESS NOTES
[facility]    OB/GYNE Progress Note      Thea Silva Patient Status:  Inpatient    1995 MRN [id number]   Location [facility] 3SE Attending Aura Addison MD   Hosp Day # 3 PCP Maribell Torrez MD     Subjective     Denies focal complaints.  Lochia normal, pain well controlled.    Review of Systems:  Constitutional: feeling better, pain improved, lochia present, and ambulating in room      Objective   Vital signs in last 24 hours:  Temp:  [97.6 °F (36.4 °C)-99.1 °F (37.3 °C)] 99.1 °F (37.3 °C)  Pulse:  [57-65] 65  Resp:  [14-16] 16  BP: (109-114)/(67-70) 109/70    Input/Output:  No intake or output data in the 24 hours ending 24 0746    Weight (Last 6):  Wt Readings from Last 6 Encounters:   24 174 lb (78.9 kg)   22 177 lb (80.3 kg)   22 177 lb (80.3 kg)   22 179 lb 8 oz (81.4 kg)   22 175 lb (79.4 kg)   03/15/22 175 lb 9.6 oz (79.7 kg)     Body mass index is 24.97 kg/m².    Constitutional: comfortable  Uterus: fundus firm  Wound/Incision:  clean, dry and intact  Extremities: No evidence of DVT seen on physical exam.      Results:          No results found.      Assessment/Plan       Assessment  Problems:   Patient Active Problem List   Diagnosis    ADD (attention deficit disorder)    History of depression    PMS (premenstrual syndrome)    Supervision of normal first pregnancy, antepartum    COVID-19 affecting pregnancy in second trimester    Pregnancy related condition in third trimester          POD #2 s/p repeat LTCS    Doing well  Desires discharge home    Capri Rodriguez MD  2024  7:46 AM

## 2024-01-27 NOTE — PLAN OF CARE
Sat with patient to review plan of care. Discussed analgesic options and answered all questions. VSS. Breastfeeding on demand. Breastfeeding successfully. Voiding independently. Lochia is WNL. Uterus is firm.     Problem: POSTPARTUM  Goal: Long Term Goal:Experiences normal postpartum course  Description: INTERVENTIONS:  - Assess and monitor vital signs and lab values.  - Assess fundus and lochia.  - Provide ice/sitz baths for perineum discomfort.  - Monitor healing of incision/episiotomy/laceration, and assess for signs and symptoms of infection and hematoma.  - Assess bladder function and monitor for bladder distention.  - Provide/instruct/assist with pericare as needed.  - Provide VTE prophylaxis as needed.  - Monitor bowel function.  - Encourage ambulation and provide assistance as needed.  - Assess and monitor emotional status and provide social service/psych resources as needed.  - Utilize standard precautions and use personal protective equipment as indicated. Ensure aseptic care of all intravenous lines and invasive tubes/drains.  - Obtain immunization and exposure to communicable diseases history.  Outcome: Adequate for Discharge  Goal: Optimize infant feeding at the breast  Description: INTERVENTIONS:  - Initiate breast feeding within first hour after birth.   - Monitor effectiveness of current breast feeding efforts.  - Assess support systems available to mother/family.  - Identify cultural beliefs/practices regarding lactation, letdown techniques, maternal food preferences.  - Assess mother's knowledge and previous experience with breast feeding.  - Provide information as needed about early infant feeding cues (e.g., rooting, lip smacking, sucking fingers/hand) versus late cue of crying.  - Discuss/demonstrate breast feeding aids (e.g., infant sling, nursing footstool/pillows, and breast pumps).  - Encourage mother/other family members to express feelings/concerns, and actively listen.  - Educate  father/SO about benefits of breast feeding and how to manage common lactation challenges.  - Recommend avoidance of specific medications or substances incompatible with breast feeding.  - Assess and monitor for signs of nipple pain/trauma.  - Instruct and provide assistance with proper latch.  - Review techniques for milk expression (breast pumping) and storage of breast milk. Provide pumping equipment/supplies, instructions and assistance, as needed.  - Encourage rooming-in and breast feeding on demand.  - Encourage skin-to-skin contact.  - Provide LC support as needed.  - Assess for and manage engorgement.  - Provide breast feeding education handouts and information on community breast feeding support.   Outcome: Adequate for Discharge  Goal: Establishment of adequate milk supply with medication/procedure interruptions  Description: INTERVENTIONS:  - Review techniques for milk expression (breast pumping).   - Provide pumping equipment/supplies, instructions, and assistance until it is safe to breastfeed infant.  Outcome: Adequate for Discharge  Goal: Experiences normal breast weaning course  Description: INTERVENTIONS:  - Assess for and manage engorgement.  - Instruct on breast care.  - Provide comfort measures.  Outcome: Adequate for Discharge  Goal: Appropriate maternal -  bonding  Description: INTERVENTIONS:  - Assess caregiver- interactions.  - Assess caregiver's emotional status and coping mechanisms.  - Encourage caregiver to participate in  daily care.  - Assess support systems available to mother/family.  - Provide /case management support as needed.  Outcome: Adequate for Discharge

## 2024-01-27 NOTE — DISCHARGE SUMMARY
[facility]  Discharge Summary    Thea Silva Patient Status:  Inpatient    1995 MRN [id number]   Location [facility] 3SE Attending Aura Addison MD   Hosp Day # 3 PCP Maribell Torrez MD     Date of Admission: 2024    Date of Discharge: 24    Admitting Diagnosis: REPEAT QUANG    Pregnancy related condition in third trimester    Discharge Diagnosis:   Patient Active Problem List   Diagnosis    ADD (attention deficit disorder)    History of depression    PMS (premenstrual syndrome)    Supervision of normal first pregnancy, antepartum    COVID-19 affecting pregnancy in second trimester    Pregnancy related condition in third trimester       Reason for Admission: Labor, hx of     Hospital Course: Delivered a baby boy by repeat     Procedures:     Complications: None    Disposition: Final discharge disposition not confirmed    Discharge Condition: Good    Discharge Medications:   Current Discharge Medication List        START taking these medications    Details   acetaminophen 500 MG Oral Tab Take 2 tablets (1,000 mg total) by mouth every 6 (six) hours.      ibuprofen 600 MG Oral Tab Take 1 tablet (600 mg total) by mouth every 6 (six) hours.  Qty: 30 tablet, Refills: 1           CONTINUE these medications which have NOT CHANGED    Details   calcium carbonate 500 MG Oral Chew Tab Chew 1 tablet (500 mg total) by mouth daily.      Prenatal Multivit-Min-Fe-FA (PRENATAL 1 + IRON OR) Take by mouth.             Follow up Visits: Follow-up with primary ob/gyn in 2 weeks    Other Discharge Instructions: none    Capri Rodriguez MD  2024  7:48 AM

## 2024-01-27 NOTE — PLAN OF CARE

## 2024-04-15 NOTE — ANESTHESIA POST-OP FOLLOW-UP NOTE
[facility]   Acute Pain Rounds Note  2024    Patient name: Thea Silva 28 year old female  : 1995  MRN: [id number]      S/P [employer] IT D#1    Current hospital day: Hospital Day: 2    Pain Scores: 0    Current Medications:  Scheduled Meds:   acetaminophen  1,000 mg Oral Q6H    ketorolac  30 mg Intravenous Q6H    [START ON 2024] ibuprofen  600 mg Oral Q6H    docusate sodium  100 mg Oral BID@0600,1800    prenatal vitamin with DHA  1 capsule Oral Daily     Continuous Infusions:   dextrose in lactated ringers      oxyTOCIN in sodium chloride 0.9% 62.5 howie-units/min (24 2679)     PRN Meds:.naloxone, acetaminophen, HYDROcodone-acetaminophen, HYDROcodone-acetaminophen, HYDROmorphone, HYDROmorphone, ondansetron, prochlorperazine, haloperidol lactate, diphenhydrAMINE **OR** diphenhydrAMINE, nalbuphine, gabapentin, witch hazel-glycerin, phenylephrine-min oil-leonard, simethicone, magnesium hydroxide, bisacodyl, ondansetron, ammonia aromatic, [START ON 2024] HYDROmorphone, [START ON 2024] oxyCODONE    PE: AAOX3. Moving extremities.    Assessment:  Pain controlled with pain meds.    Plan:  CPM    Total visit time 10 minutes    MURALI OLMSTEAD MD  Anesthesia Acute Pain Service [phone]   Never smoker

## 2024-11-18 ENCOUNTER — HOSPITAL ENCOUNTER (OUTPATIENT)
Facility: HOSPITAL | Age: 29
Discharge: HOME | End: 2024-11-18
Attending: OBSTETRICS & GYNECOLOGY | Admitting: OBSTETRICS & GYNECOLOGY
Payer: COMMERCIAL

## 2024-11-18 VITALS
BODY MASS INDEX: 23.91 KG/M2 | HEART RATE: 78 BPM | WEIGHT: 167 LBS | SYSTOLIC BLOOD PRESSURE: 116 MMHG | DIASTOLIC BLOOD PRESSURE: 64 MMHG | HEIGHT: 70 IN

## 2024-11-18 LAB
BILIRUB UR QL: NEGATIVE
CLARITY UR: CLEAR
COLOR UR: YELLOW
GLUCOSE UR-MCNC: 150 MG/DL
HGB UR QL STRIP.AUTO: NEGATIVE
LEUKOCYTE ESTERASE UR QL STRIP.AUTO: NEGATIVE
NITRITE UR QL STRIP.AUTO: NEGATIVE
PH UR: 6.5 [PH] (ref 5–8)
PROT UR-MCNC: NEGATIVE MG/DL
SP GR UR STRIP: >1.03 (ref 1–1.03)
UROBILINOGEN UR STRIP-ACNC: NORMAL

## 2024-11-18 PROCEDURE — 99214 OFFICE O/P EST MOD 30 MIN: CPT

## 2024-11-18 PROCEDURE — 81003 URINALYSIS AUTO W/O SCOPE: CPT | Performed by: OBSTETRICS & GYNECOLOGY

## 2024-11-19 NOTE — PROGRESS NOTES
Pt is a 29 year old female admitted to TR2/TR2-A.     Chief Complaint   Patient presents with    Vaginal Bleeding     Small amount of blood when wiping after toileting      Pt is  Unknown intra-uterine pregnancy.  History obtained, consents signed. Oriented to room, staff, and plan of care.

## 2024-11-19 NOTE — TRIAGE
[facility]      Triage Note    Thea Silva Patient Status:  Outpatient    1995 MRN [id number]   Location [facility] Attending Capri Diaz MD   Hosp Day # 0 PCP Maribell Torrez MD      Para:   Estimated Date of Delivery: 25  Gestation: 26w6d    Chief Complaint    Vaginal Bleeding         Allergies:  Allergies[1]    Orders Placed This Encounter   Procedures    Urinalysis with Culture Reflex       Lab Results   Component Value Date    WBC 18.8 (H) 2024    HGB 10.9 (L) 2024    HCT 32.3 (L) 2024    .0 2024    CREATSERUM 0.43 (L) 09/15/2021    BUN 12.0 09/15/2021     (L) 09/15/2021    K 3.68 09/15/2021     09/15/2021    CO2 25.7 09/15/2021    GLU 80 09/15/2021    CA 8.8 09/15/2021    ALB 4.2 09/15/2021    ALKPHO 39 09/15/2021    BILT 0.22 09/15/2021    TP 6.5 09/15/2021    AST 16 09/15/2021    ALT 12 09/15/2021    TSH 0.744 2021    TROP <0.300 09/15/2021       Clinitek UA  Lab Results   Component Value Date    GLUUR 150 (A) 2024    SPECGRAVITY >1.030 (H) 2024       UA  Lab Results   Component Value Date    COLORUR Yellow 2024    CLARITY Clear 2024    SPECGRAVITY >1.030 (H) 2024    PROUR Negative 2024    GLUUR 150 (A) 2024    KETUR Trace (A) 2024    BILUR Negative 2024    BLOODURINE Negative 2024    NITRITE Negative 2024    UROBILINOGEN Normal 2024    LEUUR Negative 2024       Vitals:    24   BP: 116/64   Pulse: 78   Weight: 75.8 kg (167 lb)   Height: 177.8 cm (5' 10\")       NST  Variability: Moderate           Accelerations: Yes           Decelerations: None            Baseline: 140 BPM           Uterine Irritability: No           Contractions: Not present                                        Acoustic Stimulator: No           Nonstress Test Interpretation: Appropriate for  gestational age           Nonstress Test Second Interpretation: Appropriate for gestational age                        Additional Comments       Chief Complaint   Patient presents with    Vaginal Bleeding     Small amount of blood when wiping after toileting     No blood or fluid noted on speculum exam. UA WNL. Orders received for discharge. Handouts given on comfort measures and kick counts. Encouraged to keep scheduled prenatal appointment. Verbalized understanding. Discharged home ambulatory in stable condition with belongings.     Paulette POWELL RN  11/18/2024 9:10 PM         [1]   Allergies  Allergen Reactions    Ceclor RASH

## 2024-12-05 LAB
AMB EXT TREPONEMAL ANTIBODIES: NONREACTIVE
HIV RESULT OB: NEGATIVE

## 2024-12-24 ENCOUNTER — HOSPITAL ENCOUNTER (OUTPATIENT)
Facility: HOSPITAL | Age: 29
Discharge: HOME | End: 2024-12-24
Attending: OBSTETRICS & GYNECOLOGY | Admitting: OBSTETRICS & GYNECOLOGY
Payer: COMMERCIAL

## 2024-12-24 PROCEDURE — 99213 OFFICE O/P EST LOW 20 MIN: CPT

## 2024-12-24 PROCEDURE — 59025 FETAL NON-STRESS TEST: CPT

## 2024-12-24 NOTE — TRIAGE
[facility]  part of [facility]      Triage Note    Thea Silva Patient Status:  Outpatient    1995 MRN [id number]   Location [facility] CENTER Attending Capri Diaz MD   Hosp Day # 0 PCP Maribell Torrez MD      Para:   Estimated Date of Delivery: 25  Gestation: 32w0d    Chief Complaint    R/o  Labor         Allergies:  Allergies[1]    No orders of the defined types were placed in this encounter.      Lab Results   Component Value Date    WBC 18.8 (H) 2024    HGB 10.9 (L) 2024    HCT 32.3 (L) 2024    .0 2024    CREATSERUM 0.43 (L) 09/15/2021    BUN 12.0 09/15/2021     (L) 09/15/2021    K 3.68 09/15/2021     09/15/2021    CO2 25.7 09/15/2021    GLU 80 09/15/2021    CA 8.8 09/15/2021    ALB 4.2 09/15/2021    ALKPHO 39 09/15/2021    BILT 0.22 09/15/2021    TP 6.5 09/15/2021    AST 16 09/15/2021    ALT 12 09/15/2021    TSH 0.744 2021    TROP <0.300 09/15/2021       Clinitek UA  Lab Results   Component Value Date    GLUUR 150 (A) 2024    SPECGRAVITY >1.030 (H) 2024       UA  Lab Results   Component Value Date    COLORUR Yellow 2024    CLARITY Clear 2024    SPECGRAVITY >1.030 (H) 2024    PROUR Negative 2024    GLUUR 150 (A) 2024    KETUR Trace (A) 2024    BILUR Negative 2024    BLOODURINE Negative 2024    NITRITE Negative 2024    UROBILINOGEN Normal 2024    LEUUR Negative 2024       There were no vitals filed for this visit.    NST  Variability: Moderate           Accelerations: Yes           Decelerations: None            Baseline: 135 BPM           Uterine Irritability: No           Contractions: Not present                                        Acoustic Stimulator: No           Nonstress Test Interpretation: Reactive           Nonstress Test Second Interpretation: Reactive                        Additional  Comments       Chief Complaint   Patient presents with    R/o  Labor     Sent from office for further evlauation     EFM tracing reactive with no contractions noted. Pt denies feeling contractions at this time. Pt states she feels \"crampy\" which occurs randomly. Dr Diaz informed of findings. Discharge order received. Pt to follow up at the office in one week. Kick count and  labor precautions reviewed with pt. Pt states understanding. Discharged to home.  Brittany ECHEVARRIA RN  2024 11:30 AM         [1]   Allergies  Allergen Reactions    Ceclor RASH

## 2024-12-24 NOTE — PROGRESS NOTES
Pt is a 29 year old female admitted to TR1/TR1-A.     Chief Complaint   Patient presents with    R/o  Labor     Sent from office for further evlauation      Pt is  32w0d intra-uterine pregnancy.  History obtained, consents signed. Oriented to room, staff, and plan of care.

## 2024-12-30 ENCOUNTER — HOSPITAL ENCOUNTER (OUTPATIENT)
Facility: HOSPITAL | Age: 29
Discharge: HOME | End: 2024-12-30
Attending: OBSTETRICS & GYNECOLOGY | Admitting: OBSTETRICS & GYNECOLOGY
Payer: COMMERCIAL

## 2024-12-30 VITALS
TEMPERATURE: 98 F | RESPIRATION RATE: 16 BRPM | SYSTOLIC BLOOD PRESSURE: 131 MMHG | HEART RATE: 94 BPM | DIASTOLIC BLOOD PRESSURE: 73 MMHG

## 2024-12-30 LAB — FIBRONECTIN FETAL SPEC QL: NEGATIVE

## 2024-12-30 PROCEDURE — 59025 FETAL NON-STRESS TEST: CPT

## 2024-12-30 PROCEDURE — 82731 ASSAY OF FETAL FIBRONECTIN: CPT | Performed by: OBSTETRICS & GYNECOLOGY

## 2024-12-30 PROCEDURE — 99214 OFFICE O/P EST MOD 30 MIN: CPT

## 2024-12-30 RX ORDER — CALCIUM CARBONATE 500 MG/1
1 TABLET, CHEWABLE ORAL DAILY
COMMUNITY

## 2024-12-30 NOTE — TRIAGE
[facility]  part of [facility]      Triage Note    Thea Silva Patient Status:  Outpatient    1995 MRN [id number]   Location [facility] Attending Sravani Copeland MD   Hosp Day # 0 PCP Maribell Torrez MD      Para:   Estimated Date of Delivery: 25  Gestation: 32w6d    Chief Complaint    R/o  Labor         Allergies:  Allergies[1]    Orders Placed This Encounter   Procedures    FETAL FIBRONECTIN    Rapid HIV    T Pallidum Screening [place]       Lab Results   Component Value Date    WBC 18.8 (H) 2024    HGB 10.9 (L) 2024    HCT 32.3 (L) 2024    .0 2024    CREATSERUM 0.43 (L) 09/15/2021    BUN 12.0 09/15/2021     (L) 09/15/2021    K 3.68 09/15/2021     09/15/2021    CO2 25.7 09/15/2021    GLU 80 09/15/2021    CA 8.8 09/15/2021    ALB 4.2 09/15/2021    ALKPHO 39 09/15/2021    BILT 0.22 09/15/2021    TP 6.5 09/15/2021    AST 16 09/15/2021    ALT 12 09/15/2021    TSH 0.744 2021    TROP <0.300 09/15/2021    FFN Negative 2024       Clinitek UA  Lab Results   Component Value Date    GLUUR 150 (A) 2024    SPECGRAVITY >1.030 (H) 2024       UA  Lab Results   Component Value Date    COLORUR Yellow 2024    CLARITY Clear 2024    SPECGRAVITY >1.030 (H) 2024    PROUR Negative 2024    GLUUR 150 (A) 2024    KETUR Trace (A) 2024    BILUR Negative 2024    BLOODURINE Negative 2024    NITRITE Negative 2024    UROBILINOGEN Normal 2024    LEUUR Negative 2024       Vitals:    12/30/24 1330   BP: 131/73   BP Location: Left arm   Pulse: 94   Resp: 16   Temp: 98 °F (36.7 °C)   TempSrc: Oral       NST  Variability: Moderate           Accelerations: Yes           Decelerations: None            Baseline: 140 BPM           Uterine Irritability: Yes           Contractions: Irregular                    Contraction Frequency:  occasional with irritability                   Acoustic Stimulator: No           Nonstress Test Interpretation: Reactive           Nonstress Test Second Interpretation: Reactive          FHR Category: Category I             Additional Comments Comments: , 32 6/7, nst reactive, pt had contractions this morning that were painful but irregular. Occasional contractions on monitor with irritability. Pt denies pain while in triage. Cervix 1.5/50/-2/posterior. FFN negative. Dr. fitch notified and pt discharged home, has office appoitment on friday. Discharge avs given and reviewed along with kick count info sheet and ptl precautions, no further questions upon discharge     Chief Complaint   Patient presents with    R/o  Labor     Contractions since 0600, irregular         Amena ECHEVARRIA RN  2024 3:09 PM         [1]   Allergies  Allergen Reactions    Ceclor RASH

## 2024-12-30 NOTE — DISCHARGE INSTRUCTIONS
Discharge Instructions    Diet: regular  Activity: Normal activity         General Instructions    Call your OB doctor if: Fluid leaking from your vagina;Uterine contractions 10 minutes or closer for 1 to 2 hours;Uterine contractions increasing in intensity and frequency;Decrease in fetal movement;Vaginal bleeding;Temperature greater than 100F;Vaginal or rectal pressure

## 2024-12-30 NOTE — PROGRESS NOTES
Pt is a 29 year old female admitted to TR2/TR2-A.     Chief Complaint   Patient presents with    R/o  Labor     Contractions since 0600, irregular      Pt is  32w6d intra-uterine pregnancy.  History obtained, consents signed. Oriented to room, staff, and plan of care.

## 2025-01-18 ENCOUNTER — HOSPITAL ENCOUNTER (OUTPATIENT)
Facility: HOSPITAL | Age: 30
Discharge: HOME | End: 2025-01-18
Attending: OBSTETRICS & GYNECOLOGY | Admitting: OBSTETRICS & GYNECOLOGY
Payer: COMMERCIAL

## 2025-01-18 VITALS — DIASTOLIC BLOOD PRESSURE: 78 MMHG | HEART RATE: 103 BPM | SYSTOLIC BLOOD PRESSURE: 125 MMHG

## 2025-01-18 LAB
BILIRUB UR QL: NEGATIVE
COLOR UR: YELLOW
GLUCOSE UR-MCNC: NORMAL MG/DL
HGB UR QL STRIP.AUTO: NEGATIVE
KETONES UR-MCNC: 20 MG/DL
LEUKOCYTE ESTERASE UR QL STRIP.AUTO: 25
NITRITE UR QL STRIP.AUTO: NEGATIVE
PH UR: 7 [PH] (ref 5–8)
PROT UR-MCNC: NEGATIVE MG/DL
SP GR UR STRIP: 1.02 (ref 1–1.03)
UROBILINOGEN UR STRIP-ACNC: NORMAL

## 2025-01-18 PROCEDURE — 99213 OFFICE O/P EST LOW 20 MIN: CPT

## 2025-01-18 PROCEDURE — 87086 URINE CULTURE/COLONY COUNT: CPT | Performed by: OBSTETRICS & GYNECOLOGY

## 2025-01-18 PROCEDURE — 81001 URINALYSIS AUTO W/SCOPE: CPT | Performed by: OBSTETRICS & GYNECOLOGY

## 2025-01-18 PROCEDURE — 59025 FETAL NON-STRESS TEST: CPT

## 2025-01-18 NOTE — PROGRESS NOTES
Pt is a 29 year old female admitted to TR4/TR4-A.     Chief Complaint   Patient presents with    R/o  Labor     Feeling UC's q 7 min, mucous discharge, and vaginal pressure. +FM. History of  x 2 and  delivery.      Pt is  35w4d intra-uterine pregnancy.  History obtained, consents signed. Oriented to room, staff, and plan of care.

## 2025-01-18 NOTE — TRIAGE
[facility]  part of [facility]      Triage Note    Thea Silva Patient Status:  Outpatient    1995 MRN [id number]   Location [facility] Attending Roxanna Lopez MD   Hosp Day # 0 PCP Maribell Torrez MD      Para:   Estimated Date of Delivery: 25  Gestation: 35w4d    Chief Complaint    R/o  Labor         Allergies:  Allergies[1]    Orders Placed This Encounter   Procedures    Urinalysis with Culture Reflex    Urine Culture, Routine       Lab Results   Component Value Date    WBC 18.8 (H) 2024    HGB 10.9 (L) 2024    HCT 32.3 (L) 2024    .0 2024    CREATSERUM 0.43 (L) 09/15/2021    BUN 12.0 09/15/2021     (L) 09/15/2021    K 3.68 09/15/2021     09/15/2021    CO2 25.7 09/15/2021    GLU 80 09/15/2021    CA 8.8 09/15/2021    ALB 4.2 09/15/2021    ALKPHO 39 09/15/2021    BILT 0.22 09/15/2021    TP 6.5 09/15/2021    AST 16 09/15/2021    ALT 12 09/15/2021    TSH 0.744 2021    TROP <0.300 09/15/2021    FFN Negative 2024       Clinitek UA  Lab Results   Component Value Date    GLUUR Normal 2025    SPECGRAVITY 1.021 2025       UA  Lab Results   Component Value Date    COLORUR Yellow 2025    CLARITY Turbid (A) 2025    SPECGRAVITY 1.021 2025    PROUR Negative 2025    GLUUR Normal 2025    KETUR 20 (A) 2025    BILUR Negative 2025    BLOODURINE Negative 2025    NITRITE Negative 2025    UROBILINOGEN Normal 2025    LEUUR 25 (A) 2025       Vitals:    25 1231   BP: 125/78   Pulse: 103       NST  Variability: Moderate           Accelerations: Yes           Decelerations: None            Baseline: 135 BPM           Uterine Irritability: Yes           Contractions: Irregular                                        Acoustic Stimulator: No           Nonstress Test Interpretation: Reactive           Nonstress Test  Second Interpretation: Reactive          FHR Category: Category I             Additional Comments Comments: Urine culture pending. Patient had no cervical change from the office, 3/50/-2 posterior. Pt given discharge instructions on s/s  labor, kick counts, oral hydration, and to call MD for any questions or concerns. Verbalizes understanding.     Chief Complaint   Patient presents with    R/o  Labor     Feeling UC's q 7 min, mucous discharge, and vaginal pressure. +FM. History of  x 2 and  delivery.         Chelsea SMART RN  2025 2:55 PM         [1]   Allergies  Allergen Reactions    Ceclor RASH

## 2025-02-01 ENCOUNTER — HOSPITAL ENCOUNTER (OUTPATIENT)
Facility: HOSPITAL | Age: 30
Discharge: HOME | End: 2025-02-01
Attending: OBSTETRICS & GYNECOLOGY | Admitting: OBSTETRICS & GYNECOLOGY
Payer: COMMERCIAL

## 2025-02-01 VITALS
BODY MASS INDEX: 25.05 KG/M2 | DIASTOLIC BLOOD PRESSURE: 75 MMHG | RESPIRATION RATE: 16 BRPM | HEIGHT: 70 IN | WEIGHT: 175 LBS | TEMPERATURE: 99 F | SYSTOLIC BLOOD PRESSURE: 129 MMHG | HEART RATE: 78 BPM

## 2025-02-01 PROCEDURE — 99213 OFFICE O/P EST LOW 20 MIN: CPT

## 2025-02-01 PROCEDURE — 59025 FETAL NON-STRESS TEST: CPT

## 2025-02-02 ENCOUNTER — HOSPITAL ENCOUNTER (INPATIENT)
Facility: HOSPITAL | Age: 30
LOS: 2 days | Discharge: HOME | End: 2025-02-04
Attending: OBSTETRICS & GYNECOLOGY | Admitting: OBSTETRICS & GYNECOLOGY
Payer: COMMERCIAL

## 2025-02-02 ENCOUNTER — ANESTHESIA (OUTPATIENT)
Dept: OBGYN UNIT | Facility: HOSPITAL | Age: 30
End: 2025-02-02
Payer: COMMERCIAL

## 2025-02-02 ENCOUNTER — ANESTHESIA EVENT (OUTPATIENT)
Dept: OBGYN UNIT | Facility: HOSPITAL | Age: 30
End: 2025-02-02
Payer: COMMERCIAL

## 2025-02-02 PROBLEM — Z98.891 PREVIOUS CESAREAN SECTION: Status: ACTIVE | Noted: 2025-02-02

## 2025-02-02 PROBLEM — Z34.90 PREGNANT (HCC): Status: ACTIVE | Noted: 2025-02-02

## 2025-02-02 PROBLEM — Z30.2 ENCOUNTER FOR TUBAL LIGATION: Status: ACTIVE | Noted: 2025-02-02

## 2025-02-02 LAB
ANTIBODY SCREEN: NEGATIVE
BASOPHILS # BLD AUTO: 0.04 X10(3) UL (ref 0–0.2)
BASOPHILS NFR BLD AUTO: 0.4 %
DEPRECATED RDW RBC AUTO: 44.9 FL (ref 35.1–46.3)
EOSINOPHIL # BLD AUTO: 0.07 X10(3) UL (ref 0–0.7)
EOSINOPHIL NFR BLD AUTO: 0.8 %
ERYTHROCYTE [DISTWIDTH] IN BLOOD BY AUTOMATED COUNT: 13.2 % (ref 11–15)
HCT VFR BLD AUTO: 34.7 %
HGB BLD-MCNC: 11.9 G/DL
IMM GRANULOCYTES # BLD AUTO: 0.03 X10(3) UL (ref 0–1)
IMM GRANULOCYTES NFR BLD: 0.3 %
LYMPHOCYTES # BLD AUTO: 1.61 X10(3) UL (ref 1–4)
LYMPHOCYTES NFR BLD AUTO: 18 %
MCH RBC QN AUTO: 32.2 PG (ref 26–34)
MCHC RBC AUTO-ENTMCNC: 34.3 G/DL (ref 31–37)
MCV RBC AUTO: 93.8 FL
MONOCYTES # BLD AUTO: 0.66 X10(3) UL (ref 0.1–1)
MONOCYTES NFR BLD AUTO: 7.4 %
NEUTROPHILS # BLD AUTO: 6.53 X10 (3) UL (ref 1.5–7.7)
NEUTROPHILS # BLD AUTO: 6.53 X10(3) UL (ref 1.5–7.7)
NEUTROPHILS NFR BLD AUTO: 73.1 %
PLATELET # BLD AUTO: 182 10(3)UL (ref 150–450)
RBC # BLD AUTO: 3.7 X10(6)UL
RH BLOOD TYPE: POSITIVE
T PALLIDUM AB SER QL IA: NONREACTIVE
WBC # BLD AUTO: 8.9 X10(3) UL (ref 4–11)

## 2025-02-02 PROCEDURE — 59514 CESAREAN DELIVERY ONLY: CPT | Performed by: OBSTETRICS & GYNECOLOGY

## 2025-02-02 PROCEDURE — 58611 LIGATE OVIDUCT(S) ADD-ON: CPT | Performed by: OBSTETRICS & GYNECOLOGY

## 2025-02-02 PROCEDURE — 0UT70ZZ RESECTION OF BILATERAL FALLOPIAN TUBES, OPEN APPROACH: ICD-10-PCS | Performed by: OBSTETRICS & GYNECOLOGY

## 2025-02-02 RX ORDER — ONDANSETRON 2 MG/ML
INJECTION INTRAMUSCULAR; INTRAVENOUS AS NEEDED
Status: DISCONTINUED | OUTPATIENT
Start: 2025-02-02 | End: 2025-02-03 | Stop reason: SURG

## 2025-02-02 RX ORDER — ONDANSETRON 2 MG/ML
4 INJECTION INTRAMUSCULAR; INTRAVENOUS EVERY 6 HOURS PRN
Status: DISCONTINUED | OUTPATIENT
Start: 2025-02-02 | End: 2025-02-03

## 2025-02-02 RX ORDER — FAMOTIDINE 10 MG/ML
20 INJECTION, SOLUTION INTRAVENOUS ONCE
Status: COMPLETED | OUTPATIENT
Start: 2025-02-02 | End: 2025-02-02

## 2025-02-02 RX ORDER — ACETAMINOPHEN 500 MG
1000 TABLET ORAL ONCE
Status: COMPLETED | OUTPATIENT
Start: 2025-02-02 | End: 2025-02-02

## 2025-02-02 RX ORDER — FAMOTIDINE 20 MG/1
20 TABLET, FILM COATED ORAL ONCE
Status: COMPLETED | OUTPATIENT
Start: 2025-02-02 | End: 2025-02-02

## 2025-02-02 RX ORDER — BUPIVACAINE HYDROCHLORIDE 7.5 MG/ML
INJECTION, SOLUTION INTRASPINAL
Status: COMPLETED | OUTPATIENT
Start: 2025-02-02 | End: 2025-02-02

## 2025-02-02 RX ORDER — ACETAMINOPHEN 325 MG/1
650 TABLET ORAL EVERY 6 HOURS PRN
Status: ACTIVE | OUTPATIENT
Start: 2025-02-02 | End: 2025-02-03

## 2025-02-02 RX ORDER — ONDANSETRON 2 MG/ML
4 INJECTION INTRAMUSCULAR; INTRAVENOUS ONCE AS NEEDED
Status: ACTIVE | OUTPATIENT
Start: 2025-02-02 | End: 2025-02-02

## 2025-02-02 RX ORDER — NALOXONE HYDROCHLORIDE 0.4 MG/ML
0.08 INJECTION, SOLUTION INTRAMUSCULAR; INTRAVENOUS; SUBCUTANEOUS
Status: ACTIVE | OUTPATIENT
Start: 2025-02-02 | End: 2025-02-03

## 2025-02-02 RX ORDER — CLINDAMYCIN PHOSPHATE 900 MG/50ML
900 INJECTION, SOLUTION INTRAVENOUS ONCE
Status: COMPLETED | OUTPATIENT
Start: 2025-02-02 | End: 2025-02-02

## 2025-02-02 RX ORDER — PROCHLORPERAZINE EDISYLATE 5 MG/ML
5 INJECTION INTRAMUSCULAR; INTRAVENOUS ONCE AS NEEDED
Status: ACTIVE | OUTPATIENT
Start: 2025-02-02 | End: 2025-02-02

## 2025-02-02 RX ORDER — HYDROCODONE BITARTRATE AND ACETAMINOPHEN 7.5; 325 MG/1; MG/1
1 TABLET ORAL EVERY 6 HOURS PRN
Status: ACTIVE | OUTPATIENT
Start: 2025-02-02 | End: 2025-02-03

## 2025-02-02 RX ORDER — METOCLOPRAMIDE HYDROCHLORIDE 5 MG/ML
10 INJECTION INTRAMUSCULAR; INTRAVENOUS ONCE
Status: COMPLETED | OUTPATIENT
Start: 2025-02-02 | End: 2025-02-02

## 2025-02-02 RX ORDER — CITRIC ACID/SODIUM CITRATE 334-500MG
30 SOLUTION, ORAL ORAL ONCE
Status: COMPLETED | OUTPATIENT
Start: 2025-02-02 | End: 2025-02-02

## 2025-02-02 RX ORDER — DIPHENHYDRAMINE HYDROCHLORIDE 50 MG/ML
12.5 INJECTION INTRAMUSCULAR; INTRAVENOUS EVERY 4 HOURS PRN
Status: ACTIVE | OUTPATIENT
Start: 2025-02-02 | End: 2025-02-03

## 2025-02-02 RX ORDER — HALOPERIDOL 5 MG/ML
0.5 INJECTION INTRAMUSCULAR ONCE AS NEEDED
Status: ACTIVE | OUTPATIENT
Start: 2025-02-02 | End: 2025-02-02

## 2025-02-02 RX ORDER — EPHEDRINE SULFATE 50 MG/ML
INJECTION INTRAVENOUS AS NEEDED
Status: DISCONTINUED | OUTPATIENT
Start: 2025-02-02 | End: 2025-02-03 | Stop reason: SURG

## 2025-02-02 RX ORDER — DEXAMETHASONE SODIUM PHOSPHATE 4 MG/ML
VIAL (ML) INJECTION AS NEEDED
Status: DISCONTINUED | OUTPATIENT
Start: 2025-02-02 | End: 2025-02-03 | Stop reason: SURG

## 2025-02-02 RX ORDER — MORPHINE SULFATE 1 MG/ML
INJECTION, SOLUTION EPIDURAL; INTRATHECAL; INTRAVENOUS
Status: COMPLETED | OUTPATIENT
Start: 2025-02-02 | End: 2025-02-02

## 2025-02-02 RX ORDER — PHENYLEPHRINE HCL 10 MG/ML
VIAL (ML) INJECTION AS NEEDED
Status: DISCONTINUED | OUTPATIENT
Start: 2025-02-02 | End: 2025-02-03 | Stop reason: SURG

## 2025-02-02 RX ORDER — SODIUM CHLORIDE, SODIUM LACTATE, POTASSIUM CHLORIDE, CALCIUM CHLORIDE 600; 310; 30; 20 MG/100ML; MG/100ML; MG/100ML; MG/100ML
125 INJECTION, SOLUTION INTRAVENOUS CONTINUOUS
Status: DISCONTINUED | OUTPATIENT
Start: 2025-02-02 | End: 2025-02-03

## 2025-02-02 RX ORDER — NALBUPHINE HYDROCHLORIDE 10 MG/ML
2.5 INJECTION INTRAMUSCULAR; INTRAVENOUS; SUBCUTANEOUS EVERY 4 HOURS PRN
Status: ACTIVE | OUTPATIENT
Start: 2025-02-02 | End: 2025-02-03

## 2025-02-02 RX ORDER — METOCLOPRAMIDE 10 MG/1
10 TABLET ORAL ONCE
Status: COMPLETED | OUTPATIENT
Start: 2025-02-02 | End: 2025-02-02

## 2025-02-02 RX ORDER — HYDROMORPHONE HYDROCHLORIDE 1 MG/ML
0.4 INJECTION, SOLUTION INTRAMUSCULAR; INTRAVENOUS; SUBCUTANEOUS
Status: ACTIVE | OUTPATIENT
Start: 2025-02-02 | End: 2025-02-03

## 2025-02-02 RX ORDER — HYDROCODONE BITARTRATE AND ACETAMINOPHEN 7.5; 325 MG/1; MG/1
2 TABLET ORAL EVERY 6 HOURS PRN
Status: ACTIVE | OUTPATIENT
Start: 2025-02-02 | End: 2025-02-03

## 2025-02-02 RX ORDER — LIDOCAINE HYDROCHLORIDE 10 MG/ML
INJECTION, SOLUTION INFILTRATION; PERINEURAL
Status: COMPLETED | OUTPATIENT
Start: 2025-02-02 | End: 2025-02-02

## 2025-02-02 RX ORDER — DIPHENHYDRAMINE HCL 25 MG
25 CAPSULE ORAL EVERY 4 HOURS PRN
Status: ACTIVE | OUTPATIENT
Start: 2025-02-02 | End: 2025-02-03

## 2025-02-02 RX ORDER — SODIUM CHLORIDE, SODIUM LACTATE, POTASSIUM CHLORIDE, CALCIUM CHLORIDE 600; 310; 30; 20 MG/100ML; MG/100ML; MG/100ML; MG/100ML
INJECTION, SOLUTION INTRAVENOUS CONTINUOUS PRN
Status: DISCONTINUED | OUTPATIENT
Start: 2025-02-02 | End: 2025-02-03 | Stop reason: SURG

## 2025-02-02 RX ORDER — HYDROMORPHONE HYDROCHLORIDE 1 MG/ML
0.6 INJECTION, SOLUTION INTRAMUSCULAR; INTRAVENOUS; SUBCUTANEOUS
Status: ACTIVE | OUTPATIENT
Start: 2025-02-02 | End: 2025-02-03

## 2025-02-02 RX ADMIN — SODIUM CHLORIDE, SODIUM LACTATE, POTASSIUM CHLORIDE, CALCIUM CHLORIDE: 600; 310; 30; 20 INJECTION, SOLUTION INTRAVENOUS at 22:39:00

## 2025-02-02 RX ADMIN — SODIUM CHLORIDE, SODIUM LACTATE, POTASSIUM CHLORIDE, CALCIUM CHLORIDE: 600; 310; 30; 20 INJECTION, SOLUTION INTRAVENOUS at 22:59:00

## 2025-02-02 RX ADMIN — LIDOCAINE HYDROCHLORIDE 5 ML: 10 INJECTION, SOLUTION INFILTRATION; PERINEURAL at 22:43:00

## 2025-02-02 RX ADMIN — PHENYLEPHRINE HCL 100 MCG: 10 MG/ML VIAL (ML) INJECTION at 22:49:00

## 2025-02-02 RX ADMIN — EPHEDRINE SULFATE 5 MG: 50 INJECTION INTRAVENOUS at 22:51:00

## 2025-02-02 RX ADMIN — ONDANSETRON 4 MG: 2 INJECTION INTRAMUSCULAR; INTRAVENOUS at 22:48:00

## 2025-02-02 RX ADMIN — BUPIVACAINE HYDROCHLORIDE 1.4 ML: 7.5 INJECTION, SOLUTION INTRASPINAL at 22:43:00

## 2025-02-02 RX ADMIN — CLINDAMYCIN PHOSPHATE 900 MG: 900 INJECTION, SOLUTION INTRAVENOUS at 22:54:00

## 2025-02-02 RX ADMIN — DEXAMETHASONE SODIUM PHOSPHATE 4 MG: 4 MG/ML VIAL (ML) INJECTION at 22:48:00

## 2025-02-02 RX ADMIN — MORPHINE SULFATE 0.3 MG: 1 INJECTION, SOLUTION EPIDURAL; INTRATHECAL; INTRAVENOUS at 22:43:00

## 2025-02-02 NOTE — PROGRESS NOTES
Pt is a 29 year old female admitted to TR2/TR2-A.     Chief Complaint   Patient presents with    R/o Labor      Pt is  37w4d intra-uterine pregnancy.  History obtained, consents signed. Oriented to room, staff, and plan of care.

## 2025-02-02 NOTE — TRIAGE
[facility]  part of [facility]      Triage Note    Thea Silva Patient Status:  Outpatient    1995 MRN [id number]   Location [facility] Attending Sravani Copeland MD   Hosp Day # 0 PCP Maribell Torrez MD      Para:   Estimated Date of Delivery: 25  Gestation: 37w4d    Chief Complaint    R/o Labor         Allergies:  Allergies[1]    No orders of the defined types were placed in this encounter.      Lab Results   Component Value Date    WBC 18.8 (H) 2024    HGB 10.9 (L) 2024    HCT 32.3 (L) 2024    .0 2024    CREATSERUM 0.43 (L) 09/15/2021    BUN 12.0 09/15/2021     (L) 09/15/2021    K 3.68 09/15/2021     09/15/2021    CO2 25.7 09/15/2021    GLU 80 09/15/2021    CA 8.8 09/15/2021    ALB 4.2 09/15/2021    ALKPHO 39 09/15/2021    BILT 0.22 09/15/2021    TP 6.5 09/15/2021    AST 16 09/15/2021    ALT 12 09/15/2021    TSH 0.744 2021    TROP <0.300 09/15/2021    FFN Negative 2024       Clinitek UA  Lab Results   Component Value Date    GLUUR Normal 2025    SPECGRAVITY 1.021 2025    URINECUL  2025     <10,000 cfu/ml Mixture of Gram positive organisms isolated - probable contamination.       UA  Lab Results   Component Value Date    COLORUR Yellow 2025    CLARITY Turbid (A) 2025    SPECGRAVITY 1.021 2025    PROUR Negative 2025    GLUUR Normal 2025    KETUR 20 (A) 2025    BILUR Negative 2025    BLOODURINE Negative 2025    NITRITE Negative 2025    UROBILINOGEN Normal 2025    LEUUR 25 (A) 2025       Vitals:    25   BP: 129/75    Pulse: 78    Resp: 16    Temp: 98.6 °F (37 °C)    TempSrc: Oral    Weight:  79.4 kg (175 lb)   Height:  177.8 cm (5' 10\")       NST    Accels present, Decels absent.  Abdomen palpated, soft. Irritability noted,                                                                                                                                                         Additional Comments   Labor precautions given. Patient verbalized understanding. Off unit ambulatory with steady gait.    Chief Complaint   Patient presents with    R/o Labor         Lashae TERRAZAS RN  2/1/2025 8:30 PM         [1]   Allergies  Allergen Reactions    Ceclor RASH

## 2025-02-03 LAB
BASOPHILS # BLD AUTO: 0.04 X10(3) UL (ref 0–0.2)
BASOPHILS NFR BLD AUTO: 0.2 %
DEPRECATED RDW RBC AUTO: 47.5 FL (ref 35.1–46.3)
EOSINOPHIL # BLD AUTO: 0 X10(3) UL (ref 0–0.7)
EOSINOPHIL NFR BLD AUTO: 0 %
ERYTHROCYTE [DISTWIDTH] IN BLOOD BY AUTOMATED COUNT: 13.1 % (ref 11–15)
HCT VFR BLD AUTO: 37.8 %
HGB BLD-MCNC: 12.4 G/DL
IMM GRANULOCYTES # BLD AUTO: 0.07 X10(3) UL (ref 0–1)
IMM GRANULOCYTES NFR BLD: 0.4 %
LYMPHOCYTES # BLD AUTO: 0.86 X10(3) UL (ref 1–4)
LYMPHOCYTES NFR BLD AUTO: 4.9 %
MCH RBC QN AUTO: 32.5 PG (ref 26–34)
MCHC RBC AUTO-ENTMCNC: 32.8 G/DL (ref 31–37)
MCV RBC AUTO: 99.2 FL
MONOCYTES # BLD AUTO: 0.91 X10(3) UL (ref 0.1–1)
MONOCYTES NFR BLD AUTO: 5.2 %
NEUTROPHILS # BLD AUTO: 15.58 X10 (3) UL (ref 1.5–7.7)
NEUTROPHILS # BLD AUTO: 15.58 X10(3) UL (ref 1.5–7.7)
NEUTROPHILS NFR BLD AUTO: 89.3 %
PLATELET # BLD AUTO: 166 10(3)UL (ref 150–450)
PLATELETS.RETICULATED NFR BLD AUTO: 12 % (ref 0–7)
RBC # BLD AUTO: 3.81 X10(6)UL
WBC # BLD AUTO: 17.5 X10(3) UL (ref 4–11)

## 2025-02-03 RX ORDER — SCOPOLAMINE 1 MG/3D
1 PATCH, EXTENDED RELEASE TRANSDERMAL
Status: DISCONTINUED | OUTPATIENT
Start: 2025-02-03 | End: 2025-02-04

## 2025-02-03 RX ORDER — AMMONIA 15 % (W/V)
0.3 AMPUL (EA) INHALATION AS NEEDED
Status: DISCONTINUED | OUTPATIENT
Start: 2025-02-03 | End: 2025-02-04

## 2025-02-03 RX ORDER — BISACODYL 10 MG
10 SUPPOSITORY, RECTAL RECTAL ONCE AS NEEDED
Status: DISCONTINUED | OUTPATIENT
Start: 2025-02-03 | End: 2025-02-04

## 2025-02-03 RX ORDER — MEPERIDINE HYDROCHLORIDE 25 MG/ML
12.5 INJECTION INTRAMUSCULAR; INTRAVENOUS; SUBCUTANEOUS ONCE AS NEEDED
Status: ACTIVE | OUTPATIENT
Start: 2025-02-03 | End: 2025-02-03

## 2025-02-03 RX ORDER — SODIUM CHLORIDE, SODIUM LACTATE, POTASSIUM CHLORIDE, CALCIUM CHLORIDE 600; 310; 30; 20 MG/100ML; MG/100ML; MG/100ML; MG/100ML
INJECTION, SOLUTION INTRAVENOUS CONTINUOUS
Status: DISCONTINUED | OUTPATIENT
Start: 2025-02-03 | End: 2025-02-04

## 2025-02-03 RX ORDER — CALCIUM CARBONATE 500 MG/1
1000 TABLET, CHEWABLE ORAL EVERY 6 HOURS PRN
Status: DISCONTINUED | OUTPATIENT
Start: 2025-02-03 | End: 2025-02-04

## 2025-02-03 RX ORDER — ACETAMINOPHEN 500 MG
1000 TABLET ORAL EVERY 6 HOURS
Status: DISCONTINUED | OUTPATIENT
Start: 2025-02-03 | End: 2025-02-04

## 2025-02-03 RX ORDER — IBUPROFEN 600 MG/1
600 TABLET, FILM COATED ORAL EVERY 6 HOURS
Status: DISCONTINUED | OUTPATIENT
Start: 2025-02-03 | End: 2025-02-04

## 2025-02-03 RX ORDER — GABAPENTIN 300 MG/1
300 CAPSULE ORAL EVERY 8 HOURS PRN
Status: DISCONTINUED | OUTPATIENT
Start: 2025-02-03 | End: 2025-02-04

## 2025-02-03 RX ORDER — KETOROLAC TROMETHAMINE 30 MG/ML
30 INJECTION, SOLUTION INTRAMUSCULAR; INTRAVENOUS ONCE
Status: COMPLETED | OUTPATIENT
Start: 2025-02-03 | End: 2025-02-03

## 2025-02-03 RX ORDER — KETOROLAC TROMETHAMINE 30 MG/ML
30 INJECTION, SOLUTION INTRAMUSCULAR; INTRAVENOUS EVERY 6 HOURS
Status: DISCONTINUED | OUTPATIENT
Start: 2025-02-03 | End: 2025-02-03

## 2025-02-03 RX ORDER — PROCHLORPERAZINE EDISYLATE 5 MG/ML
10 INJECTION INTRAMUSCULAR; INTRAVENOUS EVERY 6 HOURS PRN
Status: DISCONTINUED | OUTPATIENT
Start: 2025-02-03 | End: 2025-02-04

## 2025-02-03 RX ORDER — NALBUPHINE HYDROCHLORIDE 10 MG/ML
2.5 INJECTION INTRAMUSCULAR; INTRAVENOUS; SUBCUTANEOUS
Status: DISCONTINUED | OUTPATIENT
Start: 2025-02-03 | End: 2025-02-04

## 2025-02-03 RX ORDER — ONDANSETRON 2 MG/ML
4 INJECTION INTRAMUSCULAR; INTRAVENOUS EVERY 6 HOURS PRN
Status: DISCONTINUED | OUTPATIENT
Start: 2025-02-03 | End: 2025-02-04

## 2025-02-03 RX ORDER — ONDANSETRON 2 MG/ML
4 INJECTION INTRAMUSCULAR; INTRAVENOUS ONCE AS NEEDED
Status: COMPLETED | OUTPATIENT
Start: 2025-02-03 | End: 2025-02-03

## 2025-02-03 RX ORDER — IBUPROFEN 600 MG/1
600 TABLET, FILM COATED ORAL EVERY 6 HOURS
Status: DISCONTINUED | OUTPATIENT
Start: 2025-02-04 | End: 2025-02-03

## 2025-02-03 RX ORDER — SIMETHICONE 80 MG
80 TABLET,CHEWABLE ORAL 3 TIMES DAILY PRN
Status: DISCONTINUED | OUTPATIENT
Start: 2025-02-03 | End: 2025-02-04

## 2025-02-03 RX ORDER — DOCUSATE SODIUM 100 MG/1
100 CAPSULE, LIQUID FILLED ORAL
Status: DISCONTINUED | OUTPATIENT
Start: 2025-02-03 | End: 2025-02-04

## 2025-02-03 RX ORDER — PROCHLORPERAZINE EDISYLATE 5 MG/ML
10 INJECTION INTRAMUSCULAR; INTRAVENOUS EVERY 6 HOURS PRN
OUTPATIENT
Start: 2025-02-03

## 2025-02-03 NOTE — OPERATIVE REPORT
[facility]  part of [facility]     Section Delivery / Operative Note    Thea Silva Patient Status:  Inpatient    1995 MRN [id number]   Location [facility] Attending Sravani Copeland MD   Hosp Day # 0 PCP Maribell Torrez MD     Pre Op Diagnosis:    IUP at 37w5d   History of 2 prior   Spontaneous rupture of membranes  Desires permanent sterilization due to completion of child-bearing    Post Op Diagnosis:    Same  Single live birth    Procedure:   repeat  LTCS Procedure(s):   SECTION w/ bilateral salpingectomy  BILATERAL SALPINGECTOMY     Surgeon:  Sravani Copeland MD    Assistant Surgeon:  Dr. Millicent Dugan  .dulyass       Anesthesia: spinal Spinal    Complications: none     Antibiotics:   gentamicin and clindamycin  preoperatively    EBL: See QBL in delivery report    Delivery Fluids:  Per delivery summary   Urine Output: Per delivery summary  Urine Color: clear    Specimens:   Specimens (From admission, onward)      None            Intra-operative Findings: normal uterus, normal tubes bilaterally, normal ovaries bilaterally.   Live male infant, Apgars 9 & 9, weight 6 lbs, 11 oz   Position:  Nuchal Cord:  none  clear amniotic fluid noted.  Delayed cord clamping performed.      Infant:  Date of Delivery: 2025   Time of Delivery: 11:13 PM  Delivery Type: Caesarean Section    Infant Sex  Information for the patient's :  Dennis Silva [[id number]]   male    Infant Birthweight  Information for the patient's :  Dennis Silva [[id number]]   6 lb 11.9 oz (3.06 kg)     Presentation Vertex [1]  Position Right [3] Occiput [1] Anterior [1]    Apgars:  1 minute: 9               5 minutes: 9                        10 minutes:      Placenta:  Date/Time of Delivery: 2025 11:15 PM   Delivery: spontaneous  Placenta to Pathology: no    Cord:  Cord Gases Submitted: yes  Cord Blood/Tissue Collection: yes  Cord Complications:  none    Sponge and Needle Counts:  Verified    Operative note:  After discussing risks, benefits and alternatives, informed consent was obtained from the patient.  Patient was taken to the operating room.  With the patient in the supine position, the abdomen was prepped and draped in the usual sterile fashion.  A Pfannenstiel skin incision was made and extended through the subcutaneous tissue to the fascia.  The fascia was incised transversely.  The underlying muscles were sharply and bluntly divided.  The muscles were retracted laterally.  The peritoneum was entered bluntly. The rasta retractor was placed. A bladder flap was developed and a low transverse uterine incision was made with the knife and extended bluntly.  The infant's head could not be delivered by elevating to the incision.  The vacuum was applied and popped off x1.  The incision was extended, vacuum applied again and the head was delivered followed by the remainder of the body.  The cord was doubly clamped and divided after a 45 second delay.  The infant was handed off to the waiting neonatologist. Cord blood was obtained.  The placenta was removed spontaneously.  The uterine cavity was explored and clear off all clots and debris.  The uterine incision was closed with a layer of running, locking 0 Vicryl suture.  The abdomen was evacuated of clots, sponges, and debris.   Attention was turned to the fallopian tubes.  The left tube was grasped with a micaela clamp and elevated to the incision.  Using the LigaSure small jaw, the left fallopian tube was grasped ligated and transected.  Serial bites were taken along the mesosalpinx until the entire tube was removed.  A similar approach was taken on the right side until the entire right fallopian tube was removed.  Hemostasis was confirmed.  Rasta retractor was removed. The peritoneum was closed with 3-0 vicryl. The fascia was closed with 0 Vicryl.  Plain gut subdermal sutures were placed.  Skin was  closed with 3-0 vicryl.  Sterile dressing was placed.  Sponge, lap and needle count was correct times 2.  The patient was taken to the postoperative recovery room in good condition, having tolerated the procedure well.       Sravani Copeland MD  2/2/2025  11:49 PM

## 2025-02-03 NOTE — PLAN OF CARE
Problem: BIRTH - VAGINAL/ SECTION  Goal: Fetal and maternal status remain reassuring during the birth process  Description: INTERVENTIONS:  - Monitor vital signs  - Monitor fetal heart rate  - Monitor uterine activity  - Monitor labor progression (vaginal delivery)  - DVT prophylaxis (C/S delivery)  - Surgical antibiotic prophylaxis (C/S delivery)  Outcome: Progressing     Problem: PAIN - ADULT  Goal: Verbalizes/displays adequate comfort level or patient's stated pain goal  Description: INTERVENTIONS:  - Encourage pt to monitor pain and request assistance  - Assess pain using appropriate pain scale  - Administer analgesics based on type and severity of pain and evaluate response  - Implement non-pharmacological measures as appropriate and evaluate response  - Consider cultural and social influences on pain and pain management  - Manage/alleviate anxiety  - Utilize distraction and/or relaxation techniques  - Monitor for opioid side effects  - Notify MD/LIP if interventions unsuccessful or patient reports new pain  - Anticipate increased pain with activity and pre-medicate as appropriate  Outcome: Progressing     Problem: ANXIETY  Goal: Will report anxiety at manageable levels  Description: INTERVENTIONS:  - Administer medication as ordered  - Teach and rehearse alternative coping skills  - Provide emotional support with 1:1 interaction with staff  Outcome: Progressing     Problem: Patient Centered Care  Goal: Patient preferences are identified and integrated in the patient's plan of care  Description: Interventions:  - What would you like us to know as we care for you? This baby's name is Toi   - Provide timely, complete, and accurate information to patient/family  - Incorporate patient and family knowledge, values, beliefs, and cultural backgrounds into the planning and delivery of care  - Encourage patient/family to participate in care and decision-making at the level they choose  - [place] patient and  family perspectives and choices  Outcome: Progressing     Problem: Patient/Family Goals  Goal: Patient/Family Long Term Goal  Description: Patient's Long Term Goal: uncomplicated surgery     Interventions:  - See additional Care Plan goals for specific interventions  Outcome: Progressing  Goal: Patient/Family Short Term Goal  Description: Patient's Short Term Goal: pain management    Interventions:   - spinal per anesthesiologist   -pain medications as ordered  - See additional Care Plan goals for specific interventions  Outcome: Progressing

## 2025-02-03 NOTE — L&D DELIVERY NOTE
Dennis Silva [[id number]]      Labor Events     labor?: No   steroids?: None  Antibiotics received during labor?: Yes  Antibiotics (enter # doses in comment): clindamycin, gentamicin  Rupture date/time: 2025     Rupture type: SROM  Fluid color: Clear  Labor type: Spontaneous Onset of Labor  Intrapartum & labor complications: Group B beta strep +       [place] Presentation    Presentation: Vertex  Position: Right Occiput Anterior       Operative Delivery    Operative Vaginal Delivery: No                Shoulder Dystocia    Shoulder Dystocia: No       Anesthesia    Method: Spinal              [place] Delivery      Head delivery date/time: 2025 23:13:38   Delivery date/time:  25 23:13:41   Delivery type: Caesarean Section    Details:  Trial of labor after : No    categorization: Repeat    priority: unscheduled   Indications for : Prior Uterine Surgery   Skin incision type: 1 Pfannenstiel   Uterine Incision type: Low Transverse      Delivery location: OR  Delivery Room Temperature: 72       Delivery Providers    Delivering Clinician: Sravani Copeland MD   Delivery personnel:  Provider Role   Melany Fraire, RN Baby Nurse   Gerri Lo RN Delivery Nurse   Hope Lyn RN Charge Nurse   Millicent Dugan MD Surgeon   Jemima Vo Surgical Tech   Polly Hsu DO Anesthesiologist             Cord    Vessels: 3 Vessels  Complications: None  Timed cord clamping: Yes  Time in sec: 45  Cord blood disposition: to lab  Gases sent?: Yes        Measurements      Weight: 3060 g 6 lb 11.9 oz Length: 49.5 cm     Head circum.: 35.5 cm              Placenta    Date/time: 2025 2315  Removal: Expressed  Appearance: Intact       Apgars    Living status: Living   Apgar Scoring Key:    0 1 2    Skin color Blue or pale Acrocyanotic Completely pink    Heart rate Absent <100 bpm >100 bpm    Reflex irritability No  response Grimace Cry or active withdrawal    Muscle tone Limp Some flexion Active motion    Respiratory effort Absent Weak cry; hypoventilation Good, crying              1 Minute:  5 Minute:  10 Minute:  15 Minute:  20 Minute:      Skin color: 1  1       Heart rate: 2  2       Reflex irritablity: 2  2       Muscle tone: 2  2       Respiratory effort: 2  2       Total: 9  9          Apgars assigned by: ART       Skin to Skin    No data filed       Vaginal Count    No data filed       Lacerations    Episiotomy: None  Perineal lacerations: None      Vaginal laceration?: No      Cervical laceration?: No    Clitoral laceration?: No

## 2025-02-03 NOTE — ANESTHESIA PREPROCEDURE EVALUATION
Anesthesia PreOp Note    HPI:     Thea Silva is a 29 year old female who presents for preoperative consultation requested by: Sravani Copeland MD    Date of Surgery: 2025    Procedure(s):   SECTION w/ bilateral salpingectomy  BILATERAL SALPINGECTOMY  Indication: repeat c/s    Relevant Problems   No relevant active problems       NPO:  Last Liquid Consumption Date: 25  Last Liquid Consumption Time: 1800  Last Solid Consumption Date: 25  Last Solid Consumption Time: 1800  Last Liquid Consumption Date: 25          History Review:  Patient Active Problem List    Diagnosis Date Noted    Pregnant ([facility]) 2025    Pregnancy related condition in third trimester ([facility]) 2024    COVID-19 affecting pregnancy in second trimester ([facility]) 2021    Supervision of normal first pregnancy, antepartum ([facility]) 2021    PMS (premenstrual syndrome) 2020    ADD (attention deficit disorder) 2016    History of depression 2016       Past Medical History:    COVID-19    History of depression       Past Surgical History:   Procedure Laterality Date    Appendectomy  2014    lap             Prescriptions Prior to Admission[1]  Current Medications and Prescriptions Ordered in Epic[2]    Allergies[3]    Family History   Problem Relation Age of Onset    No Known Problems Father     No Known Problems Mother     Cancer Maternal Grandmother         breast    Cancer Maternal Grandfather         lung    Cancer Paternal Grandmother         breast    Cancer Paternal Grandfather         skin     Social History     Socioeconomic History    Marital status:    Tobacco Use    Smoking status: Never     Passive exposure: Never    Smokeless tobacco: Never   Vaping Use    Vaping status: Never Used   Substance and Sexual Activity    Alcohol use: Not Currently     Alcohol/week: 1.0 standard drink of alcohol     Types: 1 Standard drinks or equivalent per week     Comment:  social    Drug use: Never    Sexual activity: Yes     Partners: Male   Other Topics Concern     Service No    Blood Transfusions No    Caffeine Concern No    Occupational Exposure No    Hobby Hazards No    Sleep Concern No    Stress Concern No    Weight Concern No    Special Diet Yes     Comment: gluten free    Back Care No    Exercise Yes    Seat Belt Yes    Self-Exams No       Available pre-op labs reviewed.  Lab Results   Component Value Date    WBC 8.9 02/02/2025    RBC 3.70 (L) 02/02/2025    HGB 11.9 (L) 02/02/2025    HCT 34.7 (L) 02/02/2025    MCV 93.8 02/02/2025    MCH 32.2 02/02/2025    MCHC 34.3 02/02/2025    RDW 13.2 02/02/2025    .0 02/02/2025             Vital Signs:  Body mass index is 25.11 kg/m².   weight is 79.4 kg (175 lb). Her oral temperature is 98.2 °F (36.8 °C). Her blood pressure is 123/61 and her pulse is 78. Her respiration is 16.   Vitals:    02/02/25 2050 02/02/25 2100   BP:  123/61   Pulse:  78   Resp:  16   Temp: 98.2 °F (36.8 °C)    TempSrc: Oral    Weight: 79.4 kg (175 lb)         Anesthesia Evaluation     Patient summary reviewed and Nursing notes reviewed    No history of anesthetic complications   Airway   Mallampati: I  TM distance: >3 FB  Neck ROM: full  Dental - Dentition appears grossly intact     Pulmonary - normal exam   (+) recent URI (patient reports \"lingering cough\" but denies any other symptoms of URI)  (-) asthma, shortness of breath  Cardiovascular - negative ROS  Exercise tolerance: good  (-) hypertension, dysrhythmias    Rhythm: regular  Rate: normal    Neuro/Psych    (+)   attention deficit disorder    (-) seizures, neuromuscular disease    GI/Hepatic/Renal - negative ROS   (-) hepatitis, liver disease, renal disease    Endo/Other - negative ROS   (-) diabetes mellitus, blood dyscrasia  Abdominal  - normal exam                 Anesthesia Plan:   ASA:  2  Emergent    Plan:   Spinal  Post-op Pain Management: Intrathecal narcotics  Informed Consent Plan  and Risks Discussed With:  Patient and spouse  Use of Blood Products Discussed With:  Patient  Blood Product Use Consented    Discussed plan with:  Surgeon      I have informed Thea Perezglia and/or legal guardian or family member of the nature of the anesthetic plan, benefits, risks including possible dental damage if relevant, major complications, and any alternative forms of anesthetic management.   All of the patient's questions were answered to the best of my ability. The patient desires the anesthetic management as planned.  Polly Hsu DO  2/2/2025 10:36 PM  Present on Admission:  **None**           [1]   Medications Prior to Admission   Medication Sig Dispense Refill Last Dose/Taking    Doxylamine Succinate, Sleep, (UNISOM OR) Take 25 mg by mouth nightly.   2/1/2025    calcium carbonate 500 MG Oral Chew Tab Chew 1 tablet (500 mg total) by mouth daily.   2/2/2025    Prenatal Multivit-Min-Fe-FA (PRENATAL 1 + IRON OR) Take by mouth.   2/2/2025    acetaminophen 500 MG Oral Tab Take 2 tablets (1,000 mg total) by mouth every 6 (six) hours.       calcium carbonate 500 MG Oral Chew Tab Chew 1 tablet (500 mg total) by mouth daily.      [2]   Current Facility-Administered Medications Ordered in Epic   Medication Dose Route Frequency Provider Last Rate Last Admin    lactated ringers infusion  125 mL/hr Intravenous Continuous Sravani Copeland MD        ondansetron (Zofran) 4 MG/2ML injection 4 mg  4 mg Intravenous Q6H PRN Sravani Copeland MD        oxyTOCIN in sodium chloride 0.9% (Pitocin) 30 Units/500mL infusion premix  62.5-900 howie-units/min Intravenous Continuous Sravani Copeland MD        gentamicin (Garamycin) 400 mg in sodium chloride 0.9% 100 mL IVPB  5 mg/kg Intravenous Once Sravani Copeland MD        clindamycin phosphate in NaCl 0.9% (Cleocin) 900 mg/50mL IVPB premix 900 mg  900 mg Intravenous Once Sravani Copeland MD         No current [facility]-ordered outpatient medications on file.   [3]    Allergies  Allergen Reactions    Ceclor RASH

## 2025-02-03 NOTE — PLAN OF CARE
Problem: BIRTH - VAGINAL/ SECTION  Goal: Fetal and maternal status remain reassuring during the birth process  Description: INTERVENTIONS:  - Monitor vital signs  - Monitor fetal heart rate  - Monitor uterine activity  - Monitor labor progression (vaginal delivery)  - DVT prophylaxis (C/S delivery)  - Surgical antibiotic prophylaxis (C/S delivery)  Outcome: Progressing     Problem: PAIN - ADULT  Goal: Verbalizes/displays adequate comfort level or patient's stated pain goal  Description: INTERVENTIONS:  - Encourage pt to monitor pain and request assistance  - Assess pain using appropriate pain scale  - Administer analgesics based on type and severity of pain and evaluate response  - Implement non-pharmacological measures as appropriate and evaluate response  - Consider cultural and social influences on pain and pain management  - Manage/alleviate anxiety  - Utilize distraction and/or relaxation techniques  - Monitor for opioid side effects  - Notify MD/LIP if interventions unsuccessful or patient reports new pain  - Anticipate increased pain with activity and pre-medicate as appropriate  Outcome: Progressing     Problem: ANXIETY  Goal: Will report anxiety at manageable levels  Description: INTERVENTIONS:  - Administer medication as ordered  - Teach and rehearse alternative coping skills  - Provide emotional support with 1:1 interaction with staff  Outcome: Progressing    Problem: GENITOURINARY - ADULT  Goal: Absence of urinary retention  Description: INTERVENTIONS:  - Assess patient’s ability to void and empty bladder  - Monitor intake/output and perform bladder scan as needed  - Follow urinary retention protocol/standard of care  - Consider collaborating with pharmacy to review patient's medication profile  - Implement strategies to promote bladder emptying  Outcome: Progressing     Problem: POSTPARTUM  Goal: Long Term Goal:Experiences normal postpartum course  Description: INTERVENTIONS:  - Assess and  monitor vital signs and lab values.  - Assess fundus and lochia.  - Provide ice/sitz baths for perineum discomfort.  - Monitor healing of incision/episiotomy/laceration, and assess for signs and symptoms of infection and hematoma.  - Assess bladder function and monitor for bladder distention.  - Provide/instruct/assist with pericare as needed.  - Provide VTE prophylaxis as needed.  - Monitor bowel function.  - Encourage ambulation and provide assistance as needed.  - Assess and monitor emotional status and provide social service/psych resources as needed.  - Utilize standard precautions and use personal protective equipment as indicated. Ensure aseptic care of all intravenous lines and invasive tubes/drains.  - Obtain immunization and exposure to communicable diseases history.  Outcome: Progressing  Goal: Optimize infant feeding at the breast  Description: INTERVENTIONS:  - Initiate breast feeding within first hour after birth.   - Monitor effectiveness of current breast feeding efforts.  - Assess support systems available to mother/family.  - Identify cultural beliefs/practices regarding lactation, letdown techniques, maternal food preferences.  - Assess mother's knowledge and previous experience with breast feeding.  - Provide information as needed about early infant feeding cues (e.g., rooting, lip smacking, sucking fingers/hand) versus late cue of crying.  - Discuss/demonstrate breast feeding aids (e.g., infant sling, nursing footstool/pillows, and breast pumps).  - Encourage mother/other family members to express feelings/concerns, and actively listen.  - Educate father/SO about benefits of breast feeding and how to manage common lactation challenges.  - Recommend avoidance of specific medications or substances incompatible with breast feeding.  - Assess and monitor for signs of nipple pain/trauma.  - Instruct and provide assistance with proper latch.  - Review techniques for milk expression (breast pumping)  and storage of breast milk. Provide pumping equipment/supplies, instructions and assistance, as needed.  - Encourage rooming-in and breast feeding on demand.  - Encourage skin-to-skin contact.  - Provide LC support as needed.  - Assess for and manage engorgement.  - Provide breast feeding education handouts and information on community breast feeding support.   Outcome: Progressing  Goal: Establishment of adequate milk supply with medication/procedure interruptions  Description: INTERVENTIONS:  - Review techniques for milk expression (breast pumping).   - Provide pumping equipment/supplies, instructions, and assistance until it is safe to breastfeed infant.  Outcome: Progressing  Goal: Experiences normal breast weaning course  Description: INTERVENTIONS:  - Assess for and manage engorgement.  - Instruct on breast care.  - Provide comfort measures.  Outcome: Progressing  Goal: Appropriate maternal -  bonding  Description: INTERVENTIONS:  - Assess caregiver- interactions.  - Assess caregiver's emotional status and coping mechanisms.  - Encourage caregiver to participate in  daily care.  - Assess support systems available to mother/family.  - Provide /case management support as needed.  Outcome: Progressing

## 2025-02-03 NOTE — H&P
[facility]  part of [facility]    History & Physical    Thea Silva Patient Status:  Inpatient    1995 MRN [id number]   Location [facility] BIRTH [facility]ENTER Attending Sravani Copeland MD   Hosp Day # 0 PCP Maribell Torrez MD     Date of Admission:  2025      HPI:   Thea Silva is a 29 year old  female, current EGA of 37w5d with an estimated date of delivery of: 2025, by Patient Reported who presents due to leakage of fluid..    Being admitted for labor with history of prior .  She reports some strong but irregular contractions today.  Had large gush of fluid and continued leakage starting around 8pm.  Contractions are irregular, has vaginal pressure senstation.    Pt denies N/V/F/C/CP/SOB, HA, blurry vision, dizziness, RUQ pain, ctx, lof, VB.    Her current obstetrical history is significant for hx of 2 prior   Patient Active Problem List   Diagnosis    ADD (attention deficit disorder)    History of depression    PMS (premenstrual syndrome)    Supervision of normal first pregnancy, antepartum (HCC)    COVID-19 affecting pregnancy in second trimester ([facility])    Pregnancy related condition in third trimester ([facility])    Pregnant ([facility])         Fetal Movement reported as good.  GBS positive.   Rh positive.    History   Obstetric History:   OB History    Para Term  AB Living   3 2 1 1 0 2   SAB IAB Ectopic Multiple Live Births   0 0 0 0 2      # Outcome Date GA Lbr Carl/2nd Weight Sex Type Anes PTL Lv   3 Current            2  24 36w3d  5 lb 12.4 oz (2.62 kg) M Caesarean Spinal Y BONNY      Complications: Other - see comments   1 Term 22 39w1d  7 lb 1 oz (3.204 kg) M CS-LTranv EPI  BONNY       Past Medical History:   Past Medical History:    COVID-19    History of depression       Past Surgerical History:   Past Surgical History:   Procedure Laterality Date    Appendectomy  2014    lap              Social History:   Social History     Tobacco Use    Smoking status: Never     Passive exposure: Never    Smokeless tobacco: Never   Substance Use Topics    Alcohol use: Not Currently     Alcohol/week: 1.0 standard drink of alcohol     Types: 1 Standard drinks or equivalent per week     Comment: social        Allergies/Medications:   Allergies:   Allergies[1]    Medications:  Prescriptions Prior to Admission[2]      Review of Systems:   As documented in HPI      Physical Exam:   Temp:  [98.2 °F (36.8 °C)] 98.2 °F (36.8 °C)  Pulse:  [78] 78  Resp:  [16] 16  BP: (123)/(61) 123/61    Constitutional: alert and cooperative in No distress    Abdomen: soft,  nontender, gravid    Vaginal exam: Dilation: 4.5 cm    Effacement: 90 %    Station: -1    Consistency: soft    Position: mid    FHT assessment:   Baseline: 120 bpm   Variability: moderate   Accels:  No   Decels: No   Tocos:  irregular   Category: 1 tracing    Neurologic: Alert and oriented  Psychiatric: Cooperative    Results:   No results found for this or any previous visit (from the past 24 hours).    No results found.        Assessment/Plan:   IUP 37w5d  in / with spontaneous rupture of membranes    Obstetrical history significant for  2 prior  .   Patient Active Problem List   Diagnosis    ADD (attention deficit disorder)    History of depression    PMS (premenstrual syndrome)    Supervision of normal first pregnancy, antepartum ([facility])    COVID-19 affecting pregnancy in second trimester ([facility])    Pregnancy related condition in third trimester ([facility])    Pregnant ([facility])         Treatment Plan:      Thea Silva is a 29 year old  female, current EGA of 37w5d who presents for admission due to spontaneous rupture of membranes, in labor.    Risks, benefits, alternatives and possible complications have been discussed in detail with the patient.   Pre-admission, admission, and post admission procedures and expectations were discussed in detail.    All  questions answered; all appropriate consents will be signed at the Hospital.    IUP at 37w5d  Fetal heart tones category 1  Labor: admit, routine labs, pt desires repeat  section with bilateral salpingectomy.  R/b/a of procedure discussed with patient including risk of bleeding, infection, injury to surrounding structures, injury to baby, need for blood transfusion or additional procedures.  GBS positive  Proceed to Or once labs are back or sooner if fetal or maternal indication.      Sravani Copeland MD  2025  9:42 PM       [1]   Allergies  Allergen Reactions    Ceclor RASH   [2]   Medications Prior to Admission   Medication Sig Dispense Refill Last Dose/Taking    Doxylamine Succinate, Sleep, (UNISOM OR) Take 25 mg by mouth nightly.   2025    calcium carbonate 500 MG Oral Chew Tab Chew 1 tablet (500 mg total) by mouth daily.   2025    Prenatal Multivit-Min-Fe-FA (PRENATAL 1 + IRON OR) Take by mouth.   2025    acetaminophen 500 MG Oral Tab Take 2 tablets (1,000 mg total) by mouth every 6 (six) hours.       calcium carbonate 500 MG Oral Chew Tab Chew 1 tablet (500 mg total) by mouth daily.

## 2025-02-03 NOTE — PROGRESS NOTES
Pt is a 29 year old female admitted to TR4/TR4-A.     Chief Complaint   Patient presents with    R/o Rom      Pt is  37w5d intra-uterine pregnancy.  History obtained, consents signed. Oriented to room, staff, and plan of care.

## 2025-02-03 NOTE — ANESTHESIA POSTPROCEDURE EVALUATION
Patient: Thea Silva    Procedure Summary       Date: 25 Room / Location: [facility] L+D OR    Anesthesia Start:  Anesthesia Stop:     Procedures:        SECTION w/ bilateral salpingectomy (Abdomen)      BILATERAL SALPINGECTOMY (Abdomen) Diagnosis: (repeat c/s)    Surgeons: Sravani Copeland MD Anesthesiologist: Polly Hsu DO    Anesthesia Type: spinal ASA Status: 2 - Emergent            Anesthesia Type: spinal    Vitals Value Taken Time   /58 25 0038   Temp 98.5 °F (36.9 °C) 25 0000   Pulse 57 25 0056   Resp 18 25 0056   SpO2 96 % 25 0056   Vitals shown include unfiled device data.    [facility] AN Post Evaluation:   Patient Evaluated in PACU  Patient Participation: complete - patient participated  Level of Consciousness: awake and alert  Pain Score: 0  Pain Management: adequate  Airway Patency:patent  Dental exam unchanged from preop  Yes    Nausea/Vomiting: none  Cardiovascular Status: hemodynamically stable  Respiratory Status: spontaneous ventilation, unassisted, room air and nonlabored ventilation  Postoperative Hydration stable      Polly Hsu DO  2/3/2025 12:56 AM

## 2025-02-03 NOTE — ANESTHESIA POST-OP FOLLOW-UP NOTE
S/p c/s neuraxial duramorph  POD # 1   Doing well   Had N/V treated   No c/c now  No HA no BA no itching  No new neurologic signs or symptoms   Site is clean dry intact  Discharged from the service

## 2025-02-03 NOTE — PROGRESS NOTES
[facility]  part of [facility]    OB/Gyne Post  Section Progress Note      Thea Silva Patient Status:  Inpatient    1995 MRN [id number]   Location [facility] 3SE Attending Sravani Copeland MD   Hosp Day # 1 PCP Maribell Torrez MD       Subjective     Patient feeling well.   Pain well controlled. Bleeding appropriate.   Having nausea, improving   Hubbard in place   Breastfeeding      Objective   Vital signs in last 24 hours:  Temp:  [96.6 °F (35.9 °C)-98.6 °F (37 °C)] 96.6 °F (35.9 °C)  Pulse:  [49-78] 64  Resp:  [16-31] 18  BP: (104-123)/(58-77) 118/77  SpO2:  [96 %-98 %] 96 %    Input/Output:    Intake/Output Summary (Last 24 hours) at 2/3/2025 1057  Last data filed at 2/3/2025 0845  Gross per 24 hour   Intake 1997.08 ml   Output 3191 ml   Net -1193.92 ml       Constitutional: comfortable  Abdomen: soft; appropriately tender; non distended  Uterus: fundus firm at umbilicus, appropriately tender  Wound/Incision: clean dry and intact, no erythema and no ecchymosis  Extremities: No calf tenderness      Results:   Labs / Path / Radiology:    Recent Results (from the past 24 hours)   CBC With Differential With Platelet    Collection Time: 25  9:33 PM   Result Value Ref Range    WBC 8.9 4.0 - 11.0 x10(3) uL    RBC 3.70 (L) 3.80 - 5.30 x10(6)uL    HGB 11.9 (L) 12.0 - 16.0 g/dL    HCT 34.7 (L) 35.0 - 48.0 %    MCV 93.8 80.0 - 100.0 fL    MCH 32.2 26.0 - 34.0 pg    MCHC 34.3 31.0 - 37.0 g/dL    RDW-SD 44.9 35.1 - 46.3 fL    RDW 13.2 11.0 - 15.0 %    .0 150.0 - 450.0 10(3)uL    Neutrophil Absolute Prelim 6.53 1.50 - 7.70 x10 (3) uL    Neutrophil Absolute 6.53 1.50 - 7.70 x10(3) uL    Lymphocyte Absolute 1.61 1.00 - 4.00 x10(3) uL    Monocyte Absolute 0.66 0.10 - 1.00 x10(3) uL    Eosinophil Absolute 0.07 0.00 - 0.70 x10(3) uL    Basophil Absolute 0.04 0.00 - 0.20 x10(3) uL    Immature Granulocyte Absolute 0.03 0.00 - 1.00 x10(3) uL    Neutrophil % 73.1 %     Lymphocyte % 18.0 %    Monocyte % 7.4 %    Eosinophil % 0.8 %    Basophil % 0.4 %    Immature Granulocyte % 0.3 %   T Pallidum Screening Cascade    Collection Time: 25  9:33 PM   Result Value Ref Range    Treponemal Antibodies Nonreactive Nonreactive    ABORH (Blood Type)    Collection Time: 25  9:33 PM   Result Value Ref Range    ABO BLOOD TYPE A     RH BLOOD TYPE Positive    Antibody Screen    Collection Time: 25  9:33 PM   Result Value Ref Range    Antibody Screen Negative    CBC With Differential With Platelet    Collection Time: 25  5:34 AM   Result Value Ref Range    WBC 17.5 (H) 4.0 - 11.0 x10(3) uL    RBC 3.81 3.80 - 5.30 x10(6)uL    HGB 12.4 12.0 - 16.0 g/dL    HCT 37.8 35.0 - 48.0 %    MCV 99.2 80.0 - 100.0 fL    MCH 32.5 26.0 - 34.0 pg    MCHC 32.8 31.0 - 37.0 g/dL    RDW-SD 47.5 (H) 35.1 - 46.3 fL    RDW 13.1 11.0 - 15.0 %    .0 150.0 - 450.0 10(3)uL    Immature Platelet Fraction 12.0 (H) 0.0 - 7.0 %    Neutrophil Absolute Prelim 15.58 (H) 1.50 - 7.70 x10 (3) uL    Neutrophil Absolute 15.58 (H) 1.50 - 7.70 x10(3) uL    Lymphocyte Absolute 0.86 (L) 1.00 - 4.00 x10(3) uL    Monocyte Absolute 0.91 0.10 - 1.00 x10(3) uL    Eosinophil Absolute 0.00 0.00 - 0.70 x10(3) uL    Basophil Absolute 0.04 0.00 - 0.20 x10(3) uL    Immature Granulocyte Absolute 0.07 0.00 - 1.00 x10(3) uL    Neutrophil % 89.3 %    Lymphocyte % 4.9 %    Monocyte % 5.2 %    Eosinophil % 0.0 %    Basophil % 0.2 %    Immature Granulocyte % 0.4 %       Recent Labs   Lab 25  2133 25  0534   RBC 3.70* 3.81   HGB 11.9* 12.4   HCT 34.7* 37.8   MCV 93.8 99.2   MCH 32.2 32.5   MCHC 34.3 32.8   RDW 13.2 13.1   NEPRELIM 6.53 15.58*   WBC 8.9 17.5*   .0 166.0               Assessment/Plan     This is a 29 year old  s/p  delivery POD # 1    Post-Op care:  -Meeting milestones  - advance diet as tolerated  - remove bowen today, urine output good     2. Heme:   -s/p C/S Hgb 11.9    3. Baby  boy  - discussed risks of circumcision, consent signed   Will await pediatrician clearance       Giovanni Weaver MD  2/3/2025  10:57 AM

## 2025-02-03 NOTE — PROGRESS NOTES
Patient transferred to mother/baby room 365 per cart in stable condition with baby and personal belongings.  Accompanied by  and staff.  Report given to mother/baby RN Mor.

## 2025-02-04 VITALS
TEMPERATURE: 98 F | BODY MASS INDEX: 25 KG/M2 | HEART RATE: 71 BPM | OXYGEN SATURATION: 96 % | WEIGHT: 175 LBS | SYSTOLIC BLOOD PRESSURE: 108 MMHG | DIASTOLIC BLOOD PRESSURE: 64 MMHG | RESPIRATION RATE: 16 BRPM

## 2025-02-04 RX ORDER — GABAPENTIN 300 MG/1
300 CAPSULE ORAL EVERY 8 HOURS PRN
Qty: 12 CAPSULE | Refills: 0 | Status: SHIPPED | OUTPATIENT
Start: 2025-02-04

## 2025-02-04 RX ORDER — IBUPROFEN 600 MG/1
600 TABLET, FILM COATED ORAL EVERY 6 HOURS PRN
Qty: 32 TABLET | Refills: 0 | Status: SHIPPED | OUTPATIENT
Start: 2025-02-04

## 2025-02-04 RX ORDER — ACETAMINOPHEN 500 MG
1000 TABLET ORAL EVERY 6 HOURS PRN
Qty: 32 TABLET | Refills: 0 | Status: SHIPPED | OUTPATIENT
Start: 2025-02-04

## 2025-02-04 NOTE — PROGRESS NOTES
[facility]  part of [facility]    OB/Gyne Post  Section Progress Note      Thea Silva Patient Status:  Inpatient    1995 MRN [id number]   Location [facility] 3SE Attending Sravani Copeland MD   Hosp Day # 2 PCP Maribell Torrez MD       Subjective     Pt denies N/V/F/C/CP/SOB/palpitations, dizziness.      Good pain control.   Tolerating present diet.   Ambulating well.  Voiding freely.    Breastfeeding No  Formula Feeding: Yes   Tolerating Diet Yes   Flatus: Yes   BM: No       Objective   Vital signs in last 24 hours:  Temp:  [96.6 °F (35.9 °C)-97.4 °F (36.3 °C)] 97.4 °F (36.3 °C)  Pulse:  [57-66] 66  Resp:  [17-18] 17  BP: (108-118)/(68-77) 117/68  SpO2:  [96 %] 96 %    Input/Output:    Intake/Output Summary (Last 24 hours) at 2025 0808  Last data filed at 2/3/2025 2130  Gross per 24 hour   Intake 0 ml   Output 2500 ml   Net -2500 ml       Constitutional: comfortable  Abdomen: soft; appropriately tender; non distended  Uterus: fundus 1 below  umbilicus, appropriately tender  Wound/Incision:  Clean / dry / intact with steri-strips; no erythema and no ecchymosis  Extremities: No calf tenderness, No pitting edema.  Lochia: minimal      Results:   Labs / Path / Radiology:    No results found for this or any previous visit (from the past 24 hours).    Specimens (From admission, onward)      None            No results found.      Assessment/Plan   POD#  2  with following issues:   Patient Active Problem List   Diagnosis    ADD (attention deficit disorder)    History of depression    PMS (premenstrual syndrome)    Supervision of normal first pregnancy, antepartum (HCC)    COVID-19 affecting pregnancy in second trimester (HCC)    Pregnancy related condition in third trimester (HCC)    Pregnant (HCC)    Previous  section    Encounter for tubal ligation   .    This is a 29 year old  who presents s/p repeat C/S and bilateral salpingectomy presented with SROM  POD # 2     Post-Op care:  -Pt doing well overall  -Pain controlled  -Breastfeeding, lactation consultant available for assistance  CPM, ambulate  -s/p C/S Hgb 12.4      2. Disposition:  Pt comfortable about going home; will d/c home today;   Rx for Motrin and Gabapentin PO given  Wound and home instructions given to patient and she verbalized understanding  Pt to call to make appt in 2 weeks for wound check and 6 wks postpartum  check  Pt to call if any concerns arise and make a n appointment for evaluation sooner if needed    Amy Guerrero MD  2/4/2025  8:08 AM

## 2025-02-04 NOTE — PLAN OF CARE
Problem: BIRTH - VAGINAL/ SECTION  Goal: Fetal and maternal status remain reassuring during the birth process  Description: INTERVENTIONS:  - Monitor vital signs  - Monitor fetal heart rate  - Monitor uterine activity  - Monitor labor progression (vaginal delivery)  - DVT prophylaxis (C/S delivery)  - Surgical antibiotic prophylaxis (C/S delivery)  Outcome: Completed     Problem: PAIN - ADULT  Goal: Verbalizes/displays adequate comfort level or patient's stated pain goal  Description: INTERVENTIONS:  - Encourage pt to monitor pain and request assistance  - Assess pain using appropriate pain scale  - Administer analgesics based on type and severity of pain and evaluate response  - Implement non-pharmacological measures as appropriate and evaluate response  - Consider cultural and social influences on pain and pain management  - Manage/alleviate anxiety  - Utilize distraction and/or relaxation techniques  - Monitor for opioid side effects  - Notify MD/LIP if interventions unsuccessful or patient reports new pain  - Anticipate increased pain with activity and pre-medicate as appropriate  Outcome: Progressing     Problem: ANXIETY  Goal: Will report anxiety at manageable levels  Description: INTERVENTIONS:  - Administer medication as ordered  - Teach and rehearse alternative coping skills  - Provide emotional support with 1:1 interaction with staff  Outcome: Progressing     Problem: GENITOURINARY - ADULT  Goal: Absence of urinary retention  Description: INTERVENTIONS:  - Assess patient’s ability to void and empty bladder  - Monitor intake/output and perform bladder scan as needed  - Follow urinary retention protocol/standard of care  - Consider collaborating with pharmacy to review patient's medication profile  - Implement strategies to promote bladder emptying  Outcome: Progressing     Problem: POSTPARTUM  Goal: Long Term Goal:Experiences normal postpartum course  Description: INTERVENTIONS:  - Assess and  monitor vital signs and lab values.  - Assess fundus and lochia.  - Provide ice/sitz baths for perineum discomfort.  - Monitor healing of incision/episiotomy/laceration, and assess for signs and symptoms of infection and hematoma.  - Assess bladder function and monitor for bladder distention.  - Provide/instruct/assist with pericare as needed.  - Provide VTE prophylaxis as needed.  - Monitor bowel function.  - Encourage ambulation and provide assistance as needed.  - Assess and monitor emotional status and provide social service/psych resources as needed.  - Utilize standard precautions and use personal protective equipment as indicated. Ensure aseptic care of all intravenous lines and invasive tubes/drains.  - Obtain immunization and exposure to communicable diseases history.  Outcome: Progressing  Goal: Optimize infant feeding at the breast  Description: INTERVENTIONS:  - Initiate breast feeding within first hour after birth.   - Monitor effectiveness of current breast feeding efforts.  - Assess support systems available to mother/family.  - Identify cultural beliefs/practices regarding lactation, letdown techniques, maternal food preferences.  - Assess mother's knowledge and previous experience with breast feeding.  - Provide information as needed about early infant feeding cues (e.g., rooting, lip smacking, sucking fingers/hand) versus late cue of crying.  - Discuss/demonstrate breast feeding aids (e.g., infant sling, nursing footstool/pillows, and breast pumps).  - Encourage mother/other family members to express feelings/concerns, and actively listen.  - Educate father/SO about benefits of breast feeding and how to manage common lactation challenges.  - Recommend avoidance of specific medications or substances incompatible with breast feeding.  - Assess and monitor for signs of nipple pain/trauma.  - Instruct and provide assistance with proper latch.  - Review techniques for milk expression (breast pumping)  and storage of breast milk. Provide pumping equipment/supplies, instructions and assistance, as needed.  - Encourage rooming-in and breast feeding on demand.  - Encourage skin-to-skin contact.  - Provide LC support as needed.  - Assess for and manage engorgement.  - Provide breast feeding education handouts and information on community breast feeding support.   Outcome: Progressing  Goal: Establishment of adequate milk supply with medication/procedure interruptions  Description: INTERVENTIONS:  - Review techniques for milk expression (breast pumping).   - Provide pumping equipment/supplies, instructions, and assistance until it is safe to breastfeed infant.  Outcome: Progressing  Goal: Experiences normal breast weaning course  Description: INTERVENTIONS:  - Assess for and manage engorgement.  - Instruct on breast care.  - Provide comfort measures.  Outcome: Progressing  Goal: Appropriate maternal -  bonding  Description: INTERVENTIONS:  - Assess caregiver- interactions.  - Assess caregiver's emotional status and coping mechanisms.  - Encourage caregiver to participate in  daily care.  - Assess support systems available to mother/family.  - Provide /case management support as needed.  Outcome: Progressing

## 2025-02-04 NOTE — DISCHARGE SUMMARY
[facility]  part of [facility]    Discharge Summary    Thea Silva Patient Status:  Inpatient    1995 MRN [id number]   Location [facility] 3SE Attending Sravani Thomas MD   Hosp Day # 2 PCP Maribell Torrez MD     Date of Admission: 2025    Admission Diagnoses: repeat c/s  Pregnant (HCC)    Date of Discharge: 2025    Discharge Diagnoses:S/P  Section    Episode Diagnoses:   Pregnancy Problems (from 24 to present)       No problems associated with this episode.                  Hospital Course:     EDC: Estimated Date of Delivery: 25    Gestational Age: 37w5d    Date of Delivery: 2025  Time of Delivery: 11:13 PM    Antepartum complications: Pregnancy complicated by h/o C/S x2, h/o PTD at 36 weeks, GBS bacteruria (urine culture from 2024), short interval pregnancy (last C/S 2024)     Delivered By: SRAVANI THOMAS    Delivery Method: Caesarean Section    Delivery Procedures:   Surgical Procedures       Case IDs Date Procedure Surgeon Location Status    [id number]  SECTION w/ bilateral salpingectomy Sravani Thomas MD [facility] L+D OR Can            Baby: male      Apgars:  1 minute:   9                 5 minutes: 9                          10 minutes:      Feeding Method:The patient is currently breastfeeding.     Intrapartum Complications: Presented to L&D with SROM and h/o C/S x 2.  Pt desired permanent sterilization b/c done with childbearing. She underwent a repeat C/S with bilateral salpingectomy.  Refer to operative reports for details.      Lacerations      Perineal lacerations: None      Vaginal laceration?: No      Cervical laceration?: No    Clitoral laceration?: No             Episiotomy: None    Placenta: Expressed    Postpartum complications: None         Previous Encounter Meds                     betamethasone sodium phosphate & acetate (Celestone) 6 (3-3) MG/ML injection 12 mg (mg) 12 mg Given 24 8168                   Immunizations/Injections - Last 365 Days       No immunizations on file.            Pending Labs       Order Current Status    Specimen to Pathology Tissue In process            Discharge Plan:   Discharge Condition: Good    Discharge medications:  Current Discharge Medication List        New Orders    Details   gabapentin 300 MG Oral Cap Take 1 capsule (300 mg total) by mouth every 8 (eight) hours as needed (For breakthrough severe pain).      ibuprofen 600 MG Oral Tab Take 1 tablet (600 mg total) by mouth every 6 (six) hours as needed for Pain (moderate pain).           Home Meds - Modified    Details   acetaminophen 500 MG Oral Tab Take 2 tablets (1,000 mg total) by mouth every 6 (six) hours as needed for Pain (mild pain).           Home Meds - Unchanged    Details   Prenatal Multivit-Min-Fe-FA (PRENATAL 1 + IRON OR) Take by mouth.                   Discharge Diet: General diet    Discharge Activity: Pelvic rest until cleared  Further discharge instructions given to patient.    Follow up:     Follow Up in the Office: 2 weeks for follow up     Follow-up Information       Sravani Copeland MD Follow up in 2 week(s).    Specialty: OBSTETRICS & GYNECOLOGY  Why: Call the office to schedule an appt in 2 weeks for wound check and 6 weeks for postpartum visit.  Contact information:  [address]  [facility] [address] [phone]                                     Amy Guerrero MD  2/4/2025

## 2025-02-04 NOTE — PLAN OF CARE
Problem: POSTPARTUM  Goal: Long Term Goal:Experiences normal postpartum course  Description: INTERVENTIONS:  - Assess and monitor vital signs and lab values.  - Assess fundus and lochia.  - Provide ice/sitz baths for perineum discomfort.  - Monitor healing of incision/episiotomy/laceration, and assess for signs and symptoms of infection and hematoma.  - Assess bladder function and monitor for bladder distention.  - Provide/instruct/assist with pericare as needed.  - Provide VTE prophylaxis as needed.  - Monitor bowel function.  - Encourage ambulation and provide assistance as needed.  - Assess and monitor emotional status and provide social service/psych resources as needed.  - Utilize standard precautions and use personal protective equipment as indicated. Ensure aseptic care of all intravenous lines and invasive tubes/drains.  - Obtain immunization and exposure to communicable diseases history.  2/4/2025 1620 by Talisha Roach, RN  Outcome: Completed  2/4/2025 0835 by Talisha Roach, RN  Outcome: Progressing     Problem: POSTPARTUM  Goal: Optimize infant feeding at the breast  Description: INTERVENTIONS:  - Initiate breast feeding within first hour after birth.   - Monitor effectiveness of current breast feeding efforts.  - Assess support systems available to mother/family.  - Identify cultural beliefs/practices regarding lactation, letdown techniques, maternal food preferences.  - Assess mother's knowledge and previous experience with breast feeding.  - Provide information as needed about early infant feeding cues (e.g., rooting, lip smacking, sucking fingers/hand) versus late cue of crying.  - Discuss/demonstrate breast feeding aids (e.g., infant sling, nursing footstool/pillows, and breast pumps).  - Encourage mother/other family members to express feelings/concerns, and actively listen.  - Educate father/SO about benefits of breast feeding and how to manage common lactation challenges.  - Recommend  avoidance of specific medications or substances incompatible with breast feeding.  - Assess and monitor for signs of nipple pain/trauma.  - Instruct and provide assistance with proper latch.  - Review techniques for milk expression (breast pumping) and storage of breast milk. Provide pumping equipment/supplies, instructions and assistance, as needed.  - Encourage rooming-in and breast feeding on demand.  - Encourage skin-to-skin contact.  - Provide LC support as needed.  - Assess for and manage engorgement.  - Provide breast feeding education handouts and information on community breast feeding support.   2/4/2025 1620 by Talisha Roach RN  Outcome: Completed  2/4/2025 0835 by Talisha Roach RN  Outcome: Progressing     Problem: POSTPARTUM  Goal: Establishment of adequate milk supply with medication/procedure interruptions  Description: INTERVENTIONS:  - Review techniques for milk expression (breast pumping).   - Provide pumping equipment/supplies, instructions, and assistance until it is safe to breastfeed infant.  2/4/2025 1620 by Talisha Roach RN  Outcome: Completed  2/4/2025 0835 by Talisha Roach RN  Outcome: Progressing     Problem: POSTPARTUM  Goal: Experiences normal breast weaning course  Description: INTERVENTIONS:  - Assess for and manage engorgement.  - Instruct on breast care.  - Provide comfort measures.  2/4/2025 1620 by Talisha Roach RN  Outcome: Completed  2/4/2025 0835 by Talisha Roach RN  Outcome: Progressing     Problem: GASTROINTESTINAL - ADULT  Goal: Minimal or absence of nausea and vomiting  Description: INTERVENTIONS:  - Maintain adequate hydration with IV or PO as ordered and tolerated  - Nasogastric tube to low intermittent suction as ordered  - Evaluate effectiveness of ordered antiemetic medications  - Provide nonpharmacologic comfort measures as appropriate  - Advance diet as tolerated, if ordered  - Obtain nutritional consult as needed  - Evaluate  fluid balance  2/4/2025 1620 by Talisha Roach, RN  Outcome: Completed  2/4/2025 0835 by Talisha Roach, RN  Outcome: Progressing

## 2025-02-04 NOTE — PLAN OF CARE
Problem: POSTPARTUM  Goal: Long Term Goal:Experiences normal postpartum course  Description: INTERVENTIONS:  - Assess and monitor vital signs and lab values.  - Assess fundus and lochia.  - Provide ice/sitz baths for perineum discomfort.  - Monitor healing of incision/episiotomy/laceration, and assess for signs and symptoms of infection and hematoma.  - Assess bladder function and monitor for bladder distention.  - Provide/instruct/assist with pericare as needed.  - Provide VTE prophylaxis as needed.  - Monitor bowel function.  - Encourage ambulation and provide assistance as needed.  - Assess and monitor emotional status and provide social service/psych resources as needed.  - Utilize standard precautions and use personal protective equipment as indicated. Ensure aseptic care of all intravenous lines and invasive tubes/drains.  - Obtain immunization and exposure to communicable diseases history.  Outcome: Progressing     Problem: POSTPARTUM  Goal: Optimize infant feeding at the breast  Description: INTERVENTIONS:  - Initiate breast feeding within first hour after birth.   - Monitor effectiveness of current breast feeding efforts.  - Assess support systems available to mother/family.  - Identify cultural beliefs/practices regarding lactation, letdown techniques, maternal food preferences.  - Assess mother's knowledge and previous experience with breast feeding.  - Provide information as needed about early infant feeding cues (e.g., rooting, lip smacking, sucking fingers/hand) versus late cue of crying.  - Discuss/demonstrate breast feeding aids (e.g., infant sling, nursing footstool/pillows, and breast pumps).  - Encourage mother/other family members to express feelings/concerns, and actively listen.  - Educate father/SO about benefits of breast feeding and how to manage common lactation challenges.  - Recommend avoidance of specific medications or substances incompatible with breast feeding.  - Assess and monitor  for signs of nipple pain/trauma.  - Instruct and provide assistance with proper latch.  - Review techniques for milk expression (breast pumping) and storage of breast milk. Provide pumping equipment/supplies, instructions and assistance, as needed.  - Encourage rooming-in and breast feeding on demand.  - Encourage skin-to-skin contact.  - Provide LC support as needed.  - Assess for and manage engorgement.  - Provide breast feeding education handouts and information on community breast feeding support.   Outcome: Progressing     Problem: POSTPARTUM  Goal: Establishment of adequate milk supply with medication/procedure interruptions  Description: INTERVENTIONS:  - Review techniques for milk expression (breast pumping).   - Provide pumping equipment/supplies, instructions, and assistance until it is safe to breastfeed infant.  Outcome: Progressing     Problem: POSTPARTUM  Goal: Appropriate maternal -  bonding  Description: INTERVENTIONS:  - Assess caregiver- interactions.  - Assess caregiver's emotional status and coping mechanisms.  - Encourage caregiver to participate in  daily care.  - Assess support systems available to mother/family.  - Provide /case management support as needed.  Outcome: Progressing     Problem: GASTROINTESTINAL - ADULT  Goal: Minimal or absence of nausea and vomiting  Description: INTERVENTIONS:  - Maintain adequate hydration with IV or PO as ordered and tolerated  - Nasogastric tube to low intermittent suction as ordered  - Evaluate effectiveness of ordered antiemetic medications  - Provide nonpharmacologic comfort measures as appropriate  - Advance diet as tolerated, if ordered  - Obtain nutritional consult as needed  - Evaluate fluid balance  Outcome: Progressing

## 2025-02-04 NOTE — DISCHARGE INSTRUCTIONS
Pelvic rest for 6 weeks. No sex, tampons, nothing in the vagina. No sex, tampons, hot tubs or jacuzzis.    No direct water/soap on inicsion. Incision may get wet and soapy, just not directly. May run down.   Pat dry really well before covering incision with clothes.   Watch for any signs of infection: redness, swelling, pain to the touch, foul smelling odor or discharge or fever.  No heavy lifting, nothing greater then 10-15 pounds. Nothing heavier then the baby.   No strenuous activity including exercise, excessive stairs or heavy housework.   No driving for at least 2 weeks by yourself or while taking narcotics.    Call Md for any questions or concerns including: temp over 100.4, increased pain, increased bleeding or any signs of postpartum depression.     [facility] has great support for our families even after discharge.  We have virtual or in-person support groups.  Visit our website for the most up-to-date info for our many different support groups. [email or web address]       Outpatient Lactation appoints.  Call [phone]- to schedule an appt.  Our office is located in the Maternal Fetal Medicine office next to [facility] on the first floor.      New Moms Support Groups  Our weekly New Mom Support Groups are for any new parents in our community. They are led by an experienced Mother/Baby nurse or IBCLC and usually include a guest speaker on a topic of interest to new parents. These in-person groups also include Breastfeeding Support at each meeting. Bring your baby ( - 6 months) with you! Moms-to-be are also welcome! All mom's welcome even if its not your first.     MOM & BABY HOUR   Meets most  10:00 - 11:30 a.m.  Masks are not required, but be considerate of others and do not attend if mom or baby have had any symptoms of illness within the previous 24 hours. Breastfeeding support will be included at each session--just ask the leader any  breastfeeding questions you may have. Location [facility] [place] [address] [place] Go inside the front door and to the right to the “Community Education Room”.    Mom's Line: [phone]   This service is provided by [facility], has a phone line dedicated for women (or anyone worried about a women) who may be experiencing signs or symptoms of postpartum depression.    Nurturing Mom- A support group for new and expectant moms looking for support with the transition to parenthood as well as those experiencing symptoms of  anxiety and/or depression.  Please contact [email or web address] if you need directions or the link for the virtual meetings. Please contact [email or web address] if you plan to attend, but please be considerate of others and do not attend if mom or baby have had any symptoms of illness within the previous 24 hours.     La Leche League for breast feeding and parent support, Website: IIIus.org  and for the [place] group and other groups visit [email or web address]  to help find a group, all meetings are virtual.     Facebook groups-  for more support when home- [facility]ies & Mommies [facility] --- you can find mom-to-mom advice and the list of speaker topics for cradle talk program.     Helpful websites:    www.llli.org  www.cicayda.Kairos4  www.[email or web address]

## 2025-02-28 ENCOUNTER — TELEPHONE (OUTPATIENT)
Dept: OBGYN UNIT | Facility: HOSPITAL | Age: 30
End: 2025-02-28

## (undated) DEVICE — INTENDED FOR TISSUE SEPARATION, AND OTHER PROCEDURES THAT REQUIRE A SHARP SURGICAL BLADE TO PUNCTURE OR CUT.: Brand: BARD-PARKER ® DISPOSABLE SCALPELS

## (undated) DEVICE — LIGASURE EXACT DISSECTOR: Brand: LIGASURE

## (undated) DEVICE — 3M™ MEDIPORE™ H SOFT CLOTH SURGICAL TAPE 2864, 4 INCH X 10 YARD (10CM X 9,14M), 12 ROLLS/CASE: Brand: 3M™ MEDIPORE™

## (undated) DEVICE — BLADE SUR W37.2MM CUT H0.23MM GEN PURP

## (undated) NOTE — LETTER
[facility]  Administration of Anesthesia  Thea STEEN agree to be cared for by a physician anesthesiologist alone and/or with a nurse anesthetist, who is specially trained to monitor me and give me medicine to put me to sleep or keep me comfortable during my procedure    I understand that my anesthesiologist and/or anesthetist is not an employee or agent of [facility] or [facility] [employer]. He or she works for [facility]    As the patient asking for anesthesia services, I agree to:  Allow the anesthesiologist (anesthesia doctor) to give me medicine and do additional procedures as necessary. Some examples are: Starting or using an “IV” to give me medicine, fluids or blood during my procedure, and having a breathing tube placed to help me breathe when I’m asleep (intubation). In the event that my heart stops working properly, I understand that my anesthesiologist will make every effort to sustain my life, unless otherwise directed by [facility] Do Not Resuscitate documents.  Tell my anesthesia doctor before my procedure:  If I am pregnant.  The last time that I ate or drank.  iii. All of the medicines I take (including prescriptions, herbal supplements, and pills I can buy without a prescription (including street drugs/illegal medications). Failure to inform my anesthesiologist about these medicines may increase my risk of anesthetic complications.  iv.If I am allergic to anything or have had a reaction to anesthesia before.  I understand how the anesthesia medicine will help me (benefits).  I understand that with any type of anesthesia medicine there are risks:  The most common risks are: nausea, vomiting, sore throat, muscle soreness, damage to my eyes, mouth, or teeth (from breathing tube placement).  Rare risks include: remembering what happened during my procedure, allergic reactions to medications, injury to my airway, heart, lungs, vision, nerves, or  muscles and in extremely rare instances death.  My doctor has explained to me other choices available to me for my care (alternatives).  Pregnant Patients (“epidural”):  I understand that the risks of having an epidural (medicine given into my back to help control pain during labor), include itching, low blood pressure, difficulty urinating, headache or slowing of the baby’s heart. Very rare risks include infection, bleeding, seizure, irregular heart rhythms and nerve injury.  Regional Anesthesia (“spinal”, “epidural”, & “nerve blocks”):  I understand that rare but potential complications include headache, bleeding, infection, seizure, irregular heart rhythms, and nerve injury.    _____________________________________________________________________________  Patient (or Representative) Signature/Relationship to Patient  Date   Time    _____________________________________________________________________________   Name (if used)    Language/Organization   Time    _____________________________________________________________________________  Nurse Anesthetist Signature     Date   Time  _____________________________________________________________________________  Anesthesiologist Signature     Date   Time  I have discussed the procedure and information above with the patient (or patient’s representative) and answered their questions. The patient or their representative has agreed to have anesthesia services.    _____________________________________________________________________________  Witness        Date   Time  I have verified that the signature is that of the patient or patient’s representative, and that it was signed before the procedure  Patient Name: Thea Silva     : 1995                 Printed: 2025 at 9:04 PM    Medical Record #: [id number]                                            Page 1 of 1  ----------ANESTHESIA CONSENT----------

## (undated) NOTE — LETTER
[facility]  Administration of Anesthesia  Thea STEEN agree to be cared for by a physician anesthesiologist alone and/or with a nurse anesthetist, who is specially trained to monitor me and give me medicine to put me to sleep or keep me comfortable during my procedure    I understand that my anesthesiologist and/or anesthetist is not an employee or agent of [facility] or [employer]. He or she works for [facility]    As the patient asking for anesthesia services, I agree to:  Allow the anesthesiologist (anesthesia doctor) to give me medicine and do additional procedures as necessary. Some examples are: Starting or using an “IV” to give me medicine, fluids or blood during my procedure, and having a breathing tube placed to help me breathe when I’m asleep (intubation). In the event that my heart stops working properly, I understand that my anesthesiologist will make every effort to sustain my life, unless otherwise directed by [facility] Do Not Resuscitate documents.  Tell my anesthesia doctor before my procedure:  If I am pregnant.  The last time that I ate or drank.  iii. All of the medicines I take (including prescriptions, herbal supplements, and pills I can buy without a prescription (including street drugs/illegal medications). Failure to inform my anesthesiologist about these medicines may increase my risk of anesthetic complications.  iv.If I am allergic to anything or have had a reaction to anesthesia before.  I understand how the anesthesia medicine will help me (benefits).  I understand that with any type of anesthesia medicine there are risks:  The most common risks are: nausea, vomiting, sore throat, muscle soreness, damage to my eyes, mouth, or teeth (from breathing tube placement).  Rare risks include: remembering what happened during my procedure, allergic reactions to medications, injury to my airway, heart, lungs, vision, nerves, or  muscles and in extremely rare instances death.  My doctor has explained to me other choices available to me for my care (alternatives).  Pregnant Patients (“epidural”):  I understand that the risks of having an epidural (medicine given into my back to help control pain during labor), include itching, low blood pressure, difficulty urinating, headache or slowing of the baby’s heart. Very rare risks include infection, bleeding, seizure, irregular heart rhythms and nerve injury.  Regional Anesthesia (“spinal”, “epidural”, & “nerve blocks”):  I understand that rare but potential complications include headache, bleeding, infection, seizure, irregular heart rhythms, and nerve injury.    _____________________________________________________________________________  Patient (or Representative) Signature/Relationship to Patient  Date   Time    _____________________________________________________________________________   Name (if used)    Language/Organization   Time    _____________________________________________________________________________  Nurse Anesthetist Signature     Date   Time  _____________________________________________________________________________  Anesthesiologist Signature     Date   Time  I have discussed the procedure and information above with the patient (or patient’s representative) and answered their questions. The patient or their representative has agreed to have anesthesia services.    _____________________________________________________________________________  Witness        Date   Time  I have verified that the signature is that of the patient or patient’s representative, and that it was signed before the procedure  Patient Name: Thea Silva     : 1995                 Printed: 2024 at 10:35 PM    Medical Record #: [id number]                                            Page 1 of 1  ----------ANESTHESIA CONSENT----------